# Patient Record
Sex: MALE | Race: WHITE | NOT HISPANIC OR LATINO | Employment: OTHER | URBAN - METROPOLITAN AREA
[De-identification: names, ages, dates, MRNs, and addresses within clinical notes are randomized per-mention and may not be internally consistent; named-entity substitution may affect disease eponyms.]

---

## 2018-01-12 ENCOUNTER — TRANSCRIBE ORDERS (OUTPATIENT)
Dept: LAB | Facility: HOSPITAL | Age: 68
End: 2018-01-12

## 2018-01-12 ENCOUNTER — APPOINTMENT (OUTPATIENT)
Dept: LAB | Facility: HOSPITAL | Age: 68
End: 2018-01-12

## 2018-01-12 DIAGNOSIS — R50.9 HYPERTHERMIA-INDUCED DEFECT: ICD-10-CM

## 2018-01-12 DIAGNOSIS — R53.83 OTHER FATIGUE: ICD-10-CM

## 2018-01-12 DIAGNOSIS — R05.9 COUGH: Primary | ICD-10-CM

## 2018-01-12 LAB
FLUAV SUBTYP SPEC NAA+PROBE: NOT DETECTED
FLUBV RNA ISLT QL NAA+PROBE: DETECTED

## 2018-01-12 PROCEDURE — 87502 INFLUENZA DNA AMP PROBE: CPT | Performed by: PHYSICIAN ASSISTANT

## 2018-02-12 ENCOUNTER — LAB REQUISITION (OUTPATIENT)
Dept: LAB | Facility: HOSPITAL | Age: 68
End: 2018-02-12

## 2018-02-12 DIAGNOSIS — Z00.00 ROUTINE GENERAL MEDICAL EXAMINATION AT A HEALTH CARE FACILITY: ICD-10-CM

## 2018-02-12 LAB — D DIMER PPP FEU-MCNC: 0.41 MG/L (FEU) (ref 0–0.5)

## 2018-02-12 PROCEDURE — 85379 FIBRIN DEGRADATION QUANT: CPT | Performed by: INTERNAL MEDICINE

## 2018-04-12 ENCOUNTER — HOSPITAL ENCOUNTER (OUTPATIENT)
Dept: GENERAL RADIOLOGY | Facility: HOSPITAL | Age: 68
Discharge: HOME | End: 2018-04-12
Attending: INTERNAL MEDICINE | Admitting: INTERNAL MEDICINE

## 2018-04-12 ENCOUNTER — TRANSCRIBE ORDERS (OUTPATIENT)
Dept: ADMINISTRATIVE | Facility: HOSPITAL | Age: 68
End: 2018-04-12

## 2018-04-12 DIAGNOSIS — R05.9 COUGH: Primary | ICD-10-CM

## 2018-04-12 DIAGNOSIS — R05.8 SPUTUM PRODUCTION: ICD-10-CM

## 2018-04-12 PROCEDURE — 71046 X-RAY EXAM CHEST 2 VIEWS: CPT

## 2018-04-13 ENCOUNTER — TRANSCRIBE ORDERS (OUTPATIENT)
Dept: ADMINISTRATIVE | Facility: HOSPITAL | Age: 68
End: 2018-04-13

## 2018-04-13 DIAGNOSIS — Z87.891 PERSONAL HISTORY OF TOBACCO USE, PRESENTING HAZARDS TO HEALTH: Primary | ICD-10-CM

## 2018-04-20 ENCOUNTER — HOSPITAL ENCOUNTER (OUTPATIENT)
Dept: ULTRASOUND IMAGING | Facility: HOSPITAL | Age: 68
Discharge: HOME | End: 2018-04-20
Attending: INTERNAL MEDICINE | Admitting: INTERNAL MEDICINE

## 2018-04-20 ENCOUNTER — TRANSCRIBE ORDERS (OUTPATIENT)
Dept: ADMINISTRATIVE | Facility: HOSPITAL | Age: 68
End: 2018-04-20

## 2018-04-20 DIAGNOSIS — Z87.891 PERSONAL HISTORY OF TOBACCO USE, PRESENTING HAZARDS TO HEALTH: ICD-10-CM

## 2018-04-20 DIAGNOSIS — R05.9 COUGH: Primary | ICD-10-CM

## 2018-04-20 DIAGNOSIS — J44.9 CHRONIC OBSTRUCTIVE PULMONARY DISEASE, UNSPECIFIED COPD TYPE (HCC): ICD-10-CM

## 2018-04-20 PROCEDURE — 76706 US ABDL AORTA SCREEN AAA: CPT

## 2018-05-02 ENCOUNTER — HOSPITAL ENCOUNTER (OUTPATIENT)
Dept: CT IMAGING | Facility: HOSPITAL | Age: 68
Discharge: HOME | End: 2018-05-02
Attending: INTERNAL MEDICINE | Admitting: INTERNAL MEDICINE

## 2018-05-02 DIAGNOSIS — J44.9 CHRONIC OBSTRUCTIVE PULMONARY DISEASE, UNSPECIFIED COPD TYPE (HCC): ICD-10-CM

## 2018-05-02 DIAGNOSIS — R05.9 COUGH: ICD-10-CM

## 2018-05-02 PROCEDURE — 71250 CT THORAX DX C-: CPT

## 2018-10-08 ENCOUNTER — TELEPHONE (OUTPATIENT)
Dept: FAMILY MEDICINE CLINIC | Facility: CLINIC | Age: 68
End: 2018-10-08

## 2018-10-08 NOTE — TELEPHONE ENCOUNTER
DIOVAN 160-12.5 MG 1 A DAY 30 DAY SUPPLY    [facility] [place]    PT LEFT PRESCRIPTION AT HOME AND DOES NOT HAVE ANY IN [place].

## 2018-10-09 NOTE — TELEPHONE ENCOUNTER
Patient was able to get his refill sent from his local pharmacy to the [facility] in [place]. Does not need a refill.

## 2018-10-22 ENCOUNTER — OFFICE VISIT (OUTPATIENT)
Dept: FAMILY MEDICINE CLINIC | Facility: CLINIC | Age: 68
End: 2018-10-22

## 2018-10-22 VITALS
HEIGHT: 71 IN | BODY MASS INDEX: 27.05 KG/M2 | OXYGEN SATURATION: 98 % | DIASTOLIC BLOOD PRESSURE: 80 MMHG | WEIGHT: 193.2 LBS | SYSTOLIC BLOOD PRESSURE: 122 MMHG | HEART RATE: 59 BPM

## 2018-10-22 DIAGNOSIS — J30.2 SEASONAL ALLERGIES: Primary | ICD-10-CM

## 2018-10-22 DIAGNOSIS — I10 ESSENTIAL HYPERTENSION: ICD-10-CM

## 2018-10-22 DIAGNOSIS — Z23 FLU VACCINE NEED: ICD-10-CM

## 2018-10-22 PROCEDURE — 90662 IIV NO PRSV INCREASED AG IM: CPT | Performed by: PHYSICIAN ASSISTANT

## 2018-10-22 PROCEDURE — 99214 OFFICE O/P EST MOD 30 MIN: CPT | Performed by: PHYSICIAN ASSISTANT

## 2018-10-22 PROCEDURE — 90471 IMMUNIZATION ADMIN: CPT | Performed by: PHYSICIAN ASSISTANT

## 2018-10-22 RX ORDER — MONTELUKAST SODIUM 10 MG/1
10 TABLET ORAL NIGHTLY
COMMUNITY
Start: 2018-08-08 | End: 2018-10-22 | Stop reason: SDUPTHER

## 2018-10-22 RX ORDER — VALSARTAN AND HYDROCHLOROTHIAZIDE 160; 12.5 MG/1; MG/1
TABLET, FILM COATED ORAL
Refills: 0 | COMMUNITY
Start: 2018-10-08 | End: 2019-11-21 | Stop reason: SDUPTHER

## 2018-10-22 RX ORDER — EVOLOCUMAB 420 MG/3.5
KIT SUBCUTANEOUS
COMMUNITY
Start: 2018-09-26

## 2018-10-22 RX ORDER — MONTELUKAST SODIUM 10 MG/1
10 TABLET ORAL NIGHTLY
Qty: 30 TABLET | Refills: 2 | Status: SHIPPED | OUTPATIENT
Start: 2018-10-22 | End: 2019-02-22 | Stop reason: SDUPTHER

## 2018-10-22 NOTE — PROGRESS NOTES
Chief Complaint   Patient presents with   • Establish Care     Pt was previously seen by Dr Davis, seen Sally there.       HPI     Omi Owens is a 68 y.o. male who presents to [facility] care.  Patient was previously seen by Dr. Davis and myself at [facility].  Patient's past medical history is significant for hyperlipidemia, hypertension, seasonal allergies, cardiac stent and history of colon polyps.  Patient's blood pressure is stable and well-controlled.  He is followed by Dr. Ellis.  He is on repatha for cholesterol as he was not able to tolerate statins.  He recently had cough and seasonal allergies which were treated at [facility] in another [place] with azithromycin.  He is doing well today and has no complaints.  Chief Complaint   Patient presents with   • Establish Care     Pt was previously seen by Dr Davis, seen Sally there.       Past Medical History:   Diagnosis Date   • Colon polyp    • Hyperlipidemia    • Hypertension        Past Surgical History:   Procedure Laterality Date   • HERNIA REPAIR         Family History   Problem Relation Age of Onset   • Heart attack Mother    • Heart attack Father        Social History     Social History   • Marital status:      Spouse name: N/A   • Number of children: N/A   • Years of education: N/A     Occupational History   • Not on file.     Social History Main Topics   • Smoking status: Former Smoker   • Smokeless tobacco: Never Used   • Alcohol use Yes   • Drug use: No   • Sexual activity: Defer     Other Topics Concern   • Not on file     Social History Narrative   • No narrative on file       Allergies   Allergen Reactions   • Statins Myalgia       ROS    Review of Systems   Constitutional: Negative for activity change, appetite change, chills, diaphoresis, fatigue, fever, unexpected weight gain and unexpected weight loss.   HENT: Negative for congestion, dental problem, ear pain, hearing loss, nosebleeds, sinus pressure, sore throat and trouble swallowing.   "  Eyes: Negative for blurred vision, pain, redness and visual disturbance.   Respiratory: Positive for cough. Negative for apnea, chest tightness, shortness of breath and wheezing.    Cardiovascular: Negative for chest pain, palpitations and leg swelling.   Gastrointestinal: Negative for abdominal distention, abdominal pain, anal bleeding, blood in stool, constipation, diarrhea, nausea, vomiting, GERD and indigestion.   Endocrine: Negative for cold intolerance, heat intolerance, polydipsia, polyphagia and polyuria.   Genitourinary: Negative for urinary incontinence, decreased urine volume, difficulty urinating, dysuria, frequency, hematuria and urgency.   Musculoskeletal: Negative for gait problem, joint swelling and bursitis.   Skin: Negative for dry skin, rash, skin lesions and bruise.   Neurological: Negative for dizziness, tremors, seizures, syncope, speech difficulty, weakness, light-headedness, headache, memory problem and confusion.   Hematological: Does not bruise/bleed easily.   Psychiatric/Behavioral: Negative for agitation, behavioral problems, decreased concentration, hallucinations, sleep disturbance, suicidal ideas, depressed mood and stress. The patient is not nervous/anxious.        Vitals:    10/22/18 1134   BP: 122/80   BP Location: Right arm   Patient Position: Sitting   Cuff Size: Large Adult   Pulse: 59   SpO2: 98%   Weight: 87.6 kg (193 lb 3.2 oz)   Height: 180.3 cm (71\")         Current Outpatient Prescriptions:   •  montelukast (SINGULAIR) 10 MG tablet, Take 1 tablet by mouth Every Night., Disp: 30 tablet, Rfl: 2  •  REPATHA PUSHTRONEX SYSTEM 420 MG/3.5ML solution cartridge, , Disp: , Rfl:   •  valsartan-hydrochlorothiazide (DIOVAN-HCT) 160-12.5 MG per tablet, take 1/2 tablet by mouth once daily, Disp: , Rfl: 0    PE    Physical Exam   Constitutional: He is oriented to person, place, and time. He appears well-developed and well-nourished. No distress.   HENT:   Head: Normocephalic.   Eyes: " Conjunctivae are normal.   Neck: Normal range of motion.   Cardiovascular: Normal rate, regular rhythm and normal heart sounds.    Pulmonary/Chest: Effort normal and breath sounds normal.   Dry cough   Abdominal: Soft. Normal appearance. There is no tenderness. There is negative Durham's sign.   Musculoskeletal: Normal range of motion. He exhibits no edema.   Neurological: He is alert and oriented to person, place, and time.   Skin: Skin is warm. No rash noted. He is not diaphoretic. No erythema.   Psychiatric: He has a normal mood and affect. His behavior is normal. Judgment and thought content normal.   Vitals reviewed.      A/P    Omi was seen today for establish care.    Diagnoses and all orders for this visit:    Seasonal allergies  -stable, doing well  -     montelukast (SINGULAIR) 10 MG tablet; Take 1 tablet by mouth Every Night.    Essential hypertension  -stable, well-controlled  -followed by Dr. Ellis  -Dr. Ellis manages his repatha    Flu vaccine need  -     Flu Vaccine High Dose PF 65YR+ ([id number])     Patient is going to look into hepatitis A vaccination.    Plan of care reviewed with patient at the conclusion of today's visit. Education was provided regarding diagnosis, management and any prescribed or recommended OTC medications.  Patient verbalizes understanding of and agreement with management plan.    Return in about 6 months (around 4/22/2019) for Annual physical, Medicare Wellness.     Sally Mann PA-C

## 2018-10-23 NOTE — PROGRESS NOTES
I have reviewed the notes, assessments, and/or procedures performed by EMEKA Reardon, I concur with her/his documentation of Omi Owens.

## 2019-02-22 ENCOUNTER — TELEPHONE (OUTPATIENT)
Dept: FAMILY MEDICINE CLINIC | Facility: CLINIC | Age: 69
End: 2019-02-22

## 2019-02-22 DIAGNOSIS — J30.2 SEASONAL ALLERGIES: ICD-10-CM

## 2019-02-22 RX ORDER — MONTELUKAST SODIUM 10 MG/1
10 TABLET ORAL NIGHTLY
Qty: 30 TABLET | Refills: 1 | Status: SHIPPED | OUTPATIENT
Start: 2019-02-22 | End: 2019-03-28 | Stop reason: SDUPTHER

## 2019-03-28 DIAGNOSIS — J30.2 SEASONAL ALLERGIES: ICD-10-CM

## 2019-03-29 RX ORDER — MONTELUKAST SODIUM 10 MG/1
TABLET ORAL
Qty: 90 TABLET | Refills: 1 | Status: SHIPPED | OUTPATIENT
Start: 2019-03-29 | End: 2020-12-03

## 2019-04-22 ENCOUNTER — LAB REQUISITION (OUTPATIENT)
Dept: LAB | Facility: HOSPITAL | Age: 69
End: 2019-04-22

## 2019-04-22 ENCOUNTER — OFFICE VISIT (OUTPATIENT)
Dept: FAMILY MEDICINE CLINIC | Facility: CLINIC | Age: 69
End: 2019-04-22

## 2019-04-22 VITALS
WEIGHT: 193.4 LBS | OXYGEN SATURATION: 96 % | DIASTOLIC BLOOD PRESSURE: 78 MMHG | HEART RATE: 50 BPM | HEIGHT: 71 IN | BODY MASS INDEX: 27.07 KG/M2 | SYSTOLIC BLOOD PRESSURE: 126 MMHG

## 2019-04-22 DIAGNOSIS — E78.2 MIXED HYPERLIPIDEMIA: ICD-10-CM

## 2019-04-22 DIAGNOSIS — Z00.00 ROUTINE GENERAL MEDICAL EXAMINATION AT A HEALTH CARE FACILITY: ICD-10-CM

## 2019-04-22 DIAGNOSIS — Z00.00 WELLNESS EXAMINATION: Primary | ICD-10-CM

## 2019-04-22 DIAGNOSIS — Z23 NEED FOR PNEUMOCOCCAL VACCINE: ICD-10-CM

## 2019-04-22 DIAGNOSIS — J30.2 SEASONAL ALLERGIES: ICD-10-CM

## 2019-04-22 DIAGNOSIS — E55.9 VITAMIN D DEFICIENCY: ICD-10-CM

## 2019-04-22 DIAGNOSIS — I10 ESSENTIAL HYPERTENSION: ICD-10-CM

## 2019-04-22 DIAGNOSIS — Z00.00 PHYSICAL EXAM, ANNUAL: ICD-10-CM

## 2019-04-22 DIAGNOSIS — Z11.59 ENCOUNTER FOR HEPATITIS C SCREENING TEST FOR LOW RISK PATIENT: ICD-10-CM

## 2019-04-22 DIAGNOSIS — Z12.5 SPECIAL SCREENING, PROSTATE CANCER: ICD-10-CM

## 2019-04-22 PROCEDURE — 36415 COLL VENOUS BLD VENIPUNCTURE: CPT | Performed by: PHYSICIAN ASSISTANT

## 2019-04-22 PROCEDURE — 96160 PT-FOCUSED HLTH RISK ASSMT: CPT | Performed by: PHYSICIAN ASSISTANT

## 2019-04-22 PROCEDURE — G0009 ADMIN PNEUMOCOCCAL VACCINE: HCPCS | Performed by: PHYSICIAN ASSISTANT

## 2019-04-22 PROCEDURE — 99397 PER PM REEVAL EST PAT 65+ YR: CPT | Performed by: PHYSICIAN ASSISTANT

## 2019-04-22 PROCEDURE — 90732 PPSV23 VACC 2 YRS+ SUBQ/IM: CPT | Performed by: PHYSICIAN ASSISTANT

## 2019-04-22 PROCEDURE — G0439 PPPS, SUBSEQ VISIT: HCPCS | Performed by: PHYSICIAN ASSISTANT

## 2019-04-22 NOTE — PATIENT INSTRUCTIONS
"General Health Maintenance:  -Yearly dermatology exam  -Yearly eye exam if you are diabetic, otherwise every 2 years for patients without diabetes  -Dental exams/cleanings at least twice a year  -Staying current on your required colonoscopy, starts at age 50 unless there are other indications prior  -Prostate exam either manually in office or prostate specific antigen lab testing yearly starting at 50    Vaccines:  -Talk to pharmacist about shingrix shot  -Obtain flu shot when available  -Health department is recommending Hepatitis A vaccine    The largest impact you can have on your own health is getting the proper nutrition, exercise and maintaining an overall healthy body weight.  Proper nutrition involves eating lots of vegetables, fruit, minimal lean meat and avoiding processed foods and limiting refined sugars, carbohydrates and starches (\"the white stuff\").  Drinking at least 8 cups of water daily.  Walking at least 30 minutes a day and if possible, increasing this to a more cardiovascular aerobic exercise.  Maintaining a healthy body weight.      If you smoke or use tobacco products, quitting is extremely important and I am happy to discuss options with you regarding how to do this and what's available to assist you.    If you consume alcohol, limit your alcohol intake to 2 drinks a day for men and 1 drink a day for woman.    It is also important to make sure to keep regular appointments and have all general health maintenance items completed in a timely manner.      Thank you for entrusting me with your care.  It is a pleasure and honor to participate in your health.          "

## 2019-04-22 NOTE — PROGRESS NOTES
QUICK REFERENCE INFORMATION:  The ABCs of the Annual Wellness Visit    Medicare Subsequent Wellness Visit    Chief Complaint   Patient presents with   • Medicare Wellness-subsequent        Patient is a pleasant 67 y/o white male who presents to the office for annual subsequent medicare wellness exam and annual physical exam.  Patient's past medical history is significant for hypertension, hyperlipidemia and colon polyps.  He is up-to-date on colonoscopy, ophthalmology and dental exams.  Regularly seen by dermatologist.  His blood pressure is stable and well-controlled.  He is compliant on all medications.  Allergic to statins, currently on repatha.  Followed by cardiology, has stent and is on ASA 81 mg QD.  Denies any issues or concerns today.  Reports mild nasal drainage and dry cough since returning to [place] from cruise.  He recently retired and is enjoying his time off being active and golfing.  Exercises regularly and eats healthy.  Compliant on supplements.  Previously had prevnar vaccine, due for pneumonia 23 today.  Obtaining hepatitis A vaccinations at pharmacy, second one is due on the 24th.  He will inquire about shingrix vaccine.         Omi Owens is a 68 y.o. male presenting for Medicare Wellness-subsequent  .     Past Medical History:   Diagnosis Date   • Colon polyp    • Hyperlipidemia    • Hypertension       Past Surgical History:   Procedure Laterality Date   • EYE SURGERY     • HERNIA REPAIR       Family History   Problem Relation Age of Onset   • Heart attack Mother    • Heart attack Father       Social History     Socioeconomic History   • Marital status:      Spouse name: Not on file   • Number of children: Not on file   • Years of education: Not on file   • Highest education level: Not on file   Tobacco Use   • Smoking status: Former Smoker   • Smokeless tobacco: Never Used   Substance and Sexual Activity   • Alcohol use: Yes   • Drug use: No   • Sexual activity: Defer       Allergies   Allergen Reactions   • Statins Myalgia      Outpatient Medications Prior to Visit   Medication Sig Dispense Refill   • aspirin 81 MG tablet Take 81 mg by mouth Daily.     • montelukast (SINGULAIR) 10 MG tablet take 1 tablet by mouth once daily 90 tablet 1   • REPATHA PUSHTRONEX SYSTEM 420 MG/3.5ML solution cartridge      • valsartan-hydrochlorothiazide (DIOVAN-HCT) 160-12.5 MG per tablet take 1/2 tablet by mouth once daily  0     No facility-administered medications prior to visit.        Reviewed use of high risk medication in the elderly: not applicable  Reviewed for potential of harmful drug interactions in the elderly: not applicable    The following portions of the patient's history were reviewed and updated as appropriate: allergies, current medications, past family history, past medical history, past social history, past surgical history and problem list.    Review of Systems   Constitutional: Negative for chills, diaphoresis, fatigue and fever.   HENT: Positive for postnasal drip and rhinorrhea. Negative for congestion, ear pain, hearing loss, nosebleeds and sore throat.    Eyes: Positive for itching. Negative for pain.   Respiratory: Positive for cough. Negative for shortness of breath and wheezing.    Cardiovascular: Negative for chest pain, palpitations and leg swelling.   Gastrointestinal: Negative for abdominal pain, constipation, diarrhea, nausea and vomiting.   Endocrine: Negative for polyuria.   Genitourinary: Negative for dysuria, flank pain and hematuria.   Musculoskeletal: Negative for arthralgias, gait problem and myalgias.   Skin: Negative for rash and skin lesions.   Neurological: Negative for dizziness, headache and memory problem.   Psychiatric/Behavioral: Negative for sleep disturbance, suicidal ideas, depressed mood and stress. The patient is not nervous/anxious.         Vitals:    04/22/19 0828   BP: 126/78   BP Location: Right arm   Patient Position: Sitting   Cuff Size:  "Adult   Pulse: 50   SpO2: 96%   Weight: 87.7 kg (193 lb 6.4 oz)   Height: 180.3 cm (71\")   PainSc: 0-No pain       Objective    Physical Exam   Constitutional: He is oriented to person, place, and time. Vital signs are normal. He appears well-developed and well-nourished. He is active and cooperative. He does not appear ill. No distress. He is not overweight.  HENT:   Head: Normocephalic and atraumatic.   Right Ear: Hearing, tympanic membrane, external ear and ear canal normal.   Left Ear: Hearing, tympanic membrane, external ear and ear canal normal.   Nose: Nose normal. Right sinus exhibits no maxillary sinus tenderness and no frontal sinus tenderness. Left sinus exhibits no maxillary sinus tenderness and no frontal sinus tenderness.   Mouth/Throat: Uvula is midline, oropharynx is clear and moist and mucous membranes are normal.   Eyes: Conjunctivae, EOM and lids are normal.   Neck: Trachea normal, normal range of motion and phonation normal. No thyroid mass and no thyromegaly present.   Cardiovascular: Normal rate, regular rhythm and normal heart sounds.   Pulmonary/Chest: Effort normal and breath sounds normal.   Abdominal: Soft. Normal appearance. He exhibits no distension. There is no tenderness. There is no rigidity, no guarding and no CVA tenderness.   Musculoskeletal: Normal range of motion.   Lymphadenopathy:     He has no cervical adenopathy.        Right cervical: No superficial cervical adenopathy present.       Left cervical: No superficial cervical adenopathy present.   Neurological: He is alert and oriented to person, place, and time. He has normal strength and normal reflexes. Coordination and gait normal.   CN grossly intact   Skin: Skin is warm and intact. No rash noted. He is not diaphoretic. No cyanosis or erythema. Nails show no clubbing.   Psychiatric: He has a normal mood and affect. His speech is normal and behavior is normal. Judgment and thought content normal. He is not actively " hallucinating. Cognition and memory are normal. He is attentive.   Vitals reviewed.       HEALTH RISK ASSESSMENT    1950    Recent Hospitalizations:  No hospitalization(s) within the last year..      Current Medical Providers:  Patient Care Team:  Sally Mann PA-C as PCP - General (Physician Assistant)      Smoking Status:  Social History     Tobacco Use   Smoking Status Former Smoker   Smokeless Tobacco Never Used       Alcohol Consumption:  Social History     Substance and Sexual Activity   Alcohol Use Yes       Depression Screen:   PHQ-2/PHQ-9 Depression Screening 4/22/2019   Little interest or pleasure in doing things 0   Feeling down, depressed, or hopeless 0   Total Score 0       Health Habits and Functional and Cognitive Screening:  Functional & Cognitive Status 4/22/2019   Do you have difficulty preparing food and eating? No   Do you have difficulty bathing yourself, getting dressed or grooming yourself? No   Do you have difficulty using the toilet? No   Do you have difficulty moving around from place to place? No   Do you have trouble with steps or getting out of a bed or a chair? No   In the past year have you fallen or experienced a near fall? No   Current Diet Well Balanced Diet   Dental Exam Up to date   Eye Exam Up to date   Exercise (times per week) 5 times per week   Current Exercise Activities Include Running/Jogging   Do you need help using the phone?  No   Are you deaf or do you have serious difficulty hearing?  No   Do you need help with transportation? No   Do you need help shopping? No   Do you need help preparing meals?  No   Do you need help with housework?  No   Do you need help with laundry? No   Do you need help taking your medications? No   Do you need help managing money? No   Do you ever drive or ride in a car without wearing a seat belt? No   Have you felt unusual stress, anger or loneliness in the last month? No   Who do you live with? Spouse   If you need help, do  you have trouble finding someone available to you? No   Have you been bothered in the last four weeks by sexual problems? No   Do you have difficulty concentrating, remembering or making decisions? No           Does the patient have evidence of cognitive impairment? No    Aspirin use counseling? Taking ASA appropriately as indicated      Recent Lab Results:  CMP:  Lab Results   Component Value Date    BUN 15 02/16/2015    CREATININE 0.8 02/16/2015     02/16/2015    K 3.9 02/16/2015    CO2 32 (H) 02/16/2015    CALCIUM 8.0 (L) 02/16/2015    ALBUMIN 3.7 02/15/2015    BILITOT 0.6 02/15/2015    ALKPHOS 42 02/15/2015    AST 16 02/15/2015    ALT 16 02/15/2015     Lipid Panel:     HbA1c:       Visual Acuity:  No exam data present    Age-appropriate Screening Schedule:  Refer to the list below for future screening recommendations based on patient's age, sex and/or medical conditions. Orders for these recommended tests are listed in the plan section. The patient has been provided with a written plan.    Health Maintenance   Topic Date Due   • ZOSTER VACCINE (2 of 3) 12/15/2013   • PNEUMOCOCCAL VACCINES (65+ LOW/MEDIUM RISK) (1 of 2 - PCV13) 07/28/2016   • INFLUENZA VACCINE  08/01/2019   • LIPID PANEL  04/22/2020   • COLONOSCOPY  06/16/2020   • TDAP/TD VACCINES (2 - Td) 02/03/2022          Advance Care Planning:  Patient does not have an advance directive - information provided to the patient today    Identification of Risk Factors:  Risk factors include: cardiovascular risk.    Compared to one year ago, the patient feels his physical health is the same.  Compared to one year ago, the patient feels his mental health is better.      Omi was seen today for medicare wellness-subsequent.    Diagnoses and all orders for this visit:    Wellness examination  -     Hemoglobin A1c  -     TSH  -     CBC Auto Differential    Physical exam, annual  -physical exam is unremarkable, pending labs  -up-to-date on eye and dental  exams  -goes to dermatologist regularly  -pending second vaccination for hepatitis A  -completed prevnar, will obtain pneumonia 23 today  -discussed shingrix vaccine  -colonoscopy up-to-date    Essential hypertension  -stable, well-controlled  -will defer ECG to cardiology  -     Comprehensive Metabolic Panel    Seasonal allergies  -discussed starting anti-histamine and nasal saline lavage  -call if symptoms worsen or change    Vitamin D deficiency  -     Vitamin D 25 Hydroxy    Mixed hyperlipidemia  -followed by cardiology, has a stent, on ASA 81 mg QD  -unable to tolerate statins, on repatha  -will repeat lipid panel today  -     Lipid Panel    Special screening, prostate cancer  -     PSA Screen    Encounter for hepatitis C screening test for low risk patient  -     Hepatitis C Antibody    Need for pneumococcal vaccine  -     Pneumococcal polysaccharide vaccine 23-valent >= 1yo subcutaneous/IM (PPSV23)        Procedure   Procedures       Patient Self-Management and Personalized Health Advice  The patient has been provided with information about: diet, exercise and supplements and preventive services including:   · Advance directive, Diabetes screening, see lab orders, Pneumococcal vaccine , Prostate cancer screening discussed.      Follow Up:  Return in about 6 months (around 10/22/2019) for Recheck, blood pressure.     An After Visit Summary and PPPS with all of these plans were given to the patient.

## 2019-04-23 LAB
25(OH)D3+25(OH)D2 SERPL-MCNC: 89.4 NG/ML (ref 30–100)
ALBUMIN SERPL-MCNC: 4.9 G/DL (ref 3.5–5.2)
ALBUMIN/GLOB SERPL: 2 G/DL
ALP SERPL-CCNC: 59 U/L (ref 39–117)
ALT SERPL-CCNC: 15 U/L (ref 1–41)
AST SERPL-CCNC: 21 U/L (ref 1–40)
BASOPHILS # BLD AUTO: 0.07 10*3/MM3 (ref 0–0.2)
BASOPHILS NFR BLD AUTO: 1.2 % (ref 0–1.5)
BILIRUB SERPL-MCNC: 0.9 MG/DL (ref 0.2–1.2)
BUN SERPL-MCNC: 20 MG/DL (ref 8–23)
BUN/CREAT SERPL: 17.2 (ref 7–25)
CALCIUM SERPL-MCNC: 9.9 MG/DL (ref 8.6–10.5)
CHLORIDE SERPL-SCNC: 99 MMOL/L (ref 98–107)
CHOLEST SERPL-MCNC: 150 MG/DL (ref 0–200)
CO2 SERPL-SCNC: 28.9 MMOL/L (ref 22–29)
CREAT SERPL-MCNC: 1.16 MG/DL (ref 0.76–1.27)
EOSINOPHIL # BLD AUTO: 0.05 10*3/MM3 (ref 0–0.4)
EOSINOPHIL NFR BLD AUTO: 0.9 % (ref 0.3–6.2)
ERYTHROCYTE [DISTWIDTH] IN BLOOD BY AUTOMATED COUNT: 13.3 % (ref 12.3–15.4)
GLOBULIN SER CALC-MCNC: 2.4 GM/DL
GLUCOSE SERPL-MCNC: 94 MG/DL (ref 65–99)
HBA1C MFR BLD: 4.9 % (ref 4.8–5.6)
HCT VFR BLD AUTO: 52.7 % (ref 37.5–51)
HCV AB S/CO SERPL IA: <0.1 S/CO RATIO (ref 0–0.9)
HDLC SERPL-MCNC: 78 MG/DL (ref 40–60)
HGB BLD-MCNC: 17.4 G/DL (ref 13–17.7)
IMM GRANULOCYTES # BLD AUTO: 0.02 10*3/MM3 (ref 0–0.05)
IMM GRANULOCYTES NFR BLD AUTO: 0.3 % (ref 0–0.5)
LDLC SERPL CALC-MCNC: 60 MG/DL (ref 0–100)
LYMPHOCYTES # BLD AUTO: 0.91 10*3/MM3 (ref 0.7–3.1)
LYMPHOCYTES NFR BLD AUTO: 15.6 % (ref 19.6–45.3)
MCH RBC QN AUTO: 32.5 PG (ref 26.6–33)
MCHC RBC AUTO-ENTMCNC: 33 G/DL (ref 31.5–35.7)
MCV RBC AUTO: 98.5 FL (ref 79–97)
MONOCYTES # BLD AUTO: 0.55 10*3/MM3 (ref 0.1–0.9)
MONOCYTES NFR BLD AUTO: 9.4 % (ref 5–12)
NEUTROPHILS # BLD AUTO: 4.25 10*3/MM3 (ref 1.7–7)
NEUTROPHILS NFR BLD AUTO: 72.6 % (ref 42.7–76)
NRBC BLD AUTO-RTO: 0 /100 WBC (ref 0–0.2)
PLATELET # BLD AUTO: 209 10*3/MM3 (ref 140–450)
POTASSIUM SERPL-SCNC: 4.3 MMOL/L (ref 3.5–5.2)
PROT SERPL-MCNC: 7.3 G/DL (ref 6–8.5)
PSA SERPL-MCNC: 1.45 NG/ML (ref 0–4)
RBC # BLD AUTO: 5.35 10*6/MM3 (ref 4.14–5.8)
SODIUM SERPL-SCNC: 141 MMOL/L (ref 136–145)
TRIGL SERPL-MCNC: 61 MG/DL (ref 0–150)
TSH SERPL DL<=0.005 MIU/L-ACNC: 1.3 UIU/ML (ref 0.45–4.5)
VLDLC SERPL CALC-MCNC: 12.2 MG/DL
WBC # BLD AUTO: 5.85 10*3/MM3 (ref 3.4–10.8)

## 2019-07-10 ENCOUNTER — OFFICE VISIT (OUTPATIENT)
Dept: FAMILY MEDICINE CLINIC | Facility: CLINIC | Age: 69
End: 2019-07-10

## 2019-07-10 VITALS
SYSTOLIC BLOOD PRESSURE: 116 MMHG | OXYGEN SATURATION: 98 % | DIASTOLIC BLOOD PRESSURE: 76 MMHG | BODY MASS INDEX: 27.13 KG/M2 | HEART RATE: 57 BPM | HEIGHT: 71 IN | WEIGHT: 193.8 LBS

## 2019-07-10 DIAGNOSIS — J30.2 SEASONAL ALLERGIES: Primary | ICD-10-CM

## 2019-07-10 DIAGNOSIS — J01.00 ACUTE NON-RECURRENT MAXILLARY SINUSITIS: ICD-10-CM

## 2019-07-10 PROCEDURE — 99213 OFFICE O/P EST LOW 20 MIN: CPT | Performed by: PHYSICIAN ASSISTANT

## 2019-07-10 RX ORDER — LORATADINE 10 MG/1
1 CAPSULE, LIQUID FILLED ORAL DAILY
COMMUNITY

## 2019-07-10 RX ORDER — AMOXICILLIN 875 MG/1
875 TABLET, COATED ORAL EVERY 12 HOURS SCHEDULED
Qty: 20 TABLET | Refills: 0 | Status: SHIPPED | OUTPATIENT
Start: 2019-07-10 | End: 2019-07-20

## 2019-07-10 NOTE — PROGRESS NOTES
"Chief Complaint   Patient presents with   • Cough     cough, congestion, drainage. has been going on for about 2 weeks.       HPI      Omi Owens is a 68 y.o. male who presents for Cough (cough, congestion, drainage. has been going on for about 2 weeks.)    Patient presents with productive cough, facial congestion and yellow nasal drainage that has been present for the last 2 weeks.  He denies shortness of breath, fever, chills, sore throat or ear pain.  He thinks his cough may be improving but is concerned because he is going out of town and will be outside watching baseball games.  He has allergies and is compliant on claritin, singulair and nasal saline lavage.  No antibiotic allergies.    Past Medical History:   Diagnosis Date   • Colon polyp    • Hyperlipidemia    • Hypertension        Past Surgical History:   Procedure Laterality Date   • EYE SURGERY     • HERNIA REPAIR         Family History   Problem Relation Age of Onset   • Heart attack Mother    • Heart attack Father        Social History     Socioeconomic History   • Marital status:      Spouse name: Not on file   • Number of children: Not on file   • Years of education: Not on file   • Highest education level: Not on file   Tobacco Use   • Smoking status: Former Smoker   • Smokeless tobacco: Never Used   Substance and Sexual Activity   • Alcohol use: Yes   • Drug use: No   • Sexual activity: Defer       Allergies   Allergen Reactions   • Statins Myalgia       ROS    Review of Systems   Constitutional: Negative for chills, fatigue and fever.   HENT: Positive for congestion, rhinorrhea and sinus pressure. Negative for ear pain and sore throat.    Respiratory: Positive for cough. Negative for shortness of breath and wheezing.        Vitals:    07/10/19 0814   BP: 116/76   BP Location: Right arm   Patient Position: Sitting   Cuff Size: Adult   Pulse: 57   SpO2: 98%   Weight: 87.9 kg (193 lb 12.8 oz)   Height: 180.3 cm (71\")       Current " Outpatient Medications on File Prior to Visit   Medication Sig Dispense Refill   • aspirin 81 MG tablet Take 81 mg by mouth Daily.     • Loratadine 10 MG capsule Take 1 tablet by mouth Daily.     • montelukast (SINGULAIR) 10 MG tablet take 1 tablet by mouth once daily 90 tablet 1   • REPATHA PUSHTRONEX SYSTEM 420 MG/3.5ML solution cartridge      • valsartan-hydrochlorothiazide (DIOVAN-HCT) 160-12.5 MG per tablet take 1/2 tablet by mouth once daily  0     No current facility-administered medications on file prior to visit.        Results for orders placed or performed in visit on 04/22/19   Hepatitis C Antibody   Result Value Ref Range    Hep C Virus Ab <0.1 0.0 - 0.9 s/co ratio   PSA Screen   Result Value Ref Range    PSA 1.450 0.000 - 4.000 ng/mL   Lipid Panel   Result Value Ref Range    Total Cholesterol 150 0 - 200 mg/dL    Triglycerides 61 0 - 150 mg/dL    HDL Cholesterol 78 (H) 40 - 60 mg/dL    VLDL Cholesterol 12.2 mg/dL    LDL Cholesterol  60 0 - 100 mg/dL   Hemoglobin A1c   Result Value Ref Range    Hemoglobin A1C 4.90 4.80 - 5.60 %   TSH   Result Value Ref Range    TSH 1.300 0.450 - 4.500 uIU/mL   Vitamin D 25 Hydroxy   Result Value Ref Range    25 Hydroxy, Vitamin D 89.4 30.0 - 100.0 ng/ml   Comprehensive Metabolic Panel   Result Value Ref Range    Glucose 94 65 - 99 mg/dL    BUN 20 8 - 23 mg/dL    Creatinine 1.16 0.76 - 1.27 mg/dL    eGFR Non African Am 63 >60 mL/min/1.73    eGFR African Am 76 >60 mL/min/1.73    BUN/Creatinine Ratio 17.2 7.0 - 25.0    Sodium 141 136 - 145 mmol/L    Potassium 4.3 3.5 - 5.2 mmol/L    Chloride 99 98 - 107 mmol/L    Total CO2 28.9 22.0 - 29.0 mmol/L    Calcium 9.9 8.6 - 10.5 mg/dL    Total Protein 7.3 6.0 - 8.5 g/dL    Albumin 4.90 3.50 - 5.20 g/dL    Globulin 2.4 gm/dL    A/G Ratio 2.0 g/dL    Total Bilirubin 0.9 0.2 - 1.2 mg/dL    Alkaline Phosphatase 59 39 - 117 U/L    AST (SGOT) 21 1 - 40 U/L    ALT (SGPT) 15 1 - 41 U/L   CBC & Differential   Result Value Ref Range    WBC  5.85 3.40 - 10.80 10*3/mm3    RBC 5.35 4.14 - 5.80 10*6/mm3    Hemoglobin 17.4 13.0 - 17.7 g/dL    Hematocrit 52.7 (H) 37.5 - 51.0 %    MCV 98.5 (H) 79.0 - 97.0 fL    MCH 32.5 26.6 - 33.0 pg    MCHC 33.0 31.5 - 35.7 g/dL    RDW 13.3 12.3 - 15.4 %    Platelets 209 140 - 450 10*3/mm3    Neutrophil Rel % 72.6 42.7 - 76.0 %    Lymphocyte Rel % 15.6 (L) 19.6 - 45.3 %    Monocyte Rel % 9.4 5.0 - 12.0 %    Eosinophil Rel % 0.9 0.3 - 6.2 %    Basophil Rel % 1.2 0.0 - 1.5 %    Neutrophils Absolute 4.25 1.70 - 7.00 10*3/mm3    Lymphocytes Absolute 0.91 0.70 - 3.10 10*3/mm3    Monocytes Absolute 0.55 0.10 - 0.90 10*3/mm3    Eosinophils Absolute 0.05 0.00 - 0.40 10*3/mm3    Basophils Absolute 0.07 0.00 - 0.20 10*3/mm3    Immature Granulocyte Rel % 0.3 0.0 - 0.5 %    Immature Grans Absolute 0.02 0.00 - 0.05 10*3/mm3    nRBC 0.0 0.0 - 0.2 /100 WBC       PE    Physical Exam   Constitutional: He is oriented to person, place, and time. Vital signs are normal. He appears well-developed and well-nourished. He appears ill. No distress. He is not overweight.  HENT:   Head: Normocephalic and atraumatic.   Right Ear: Hearing, tympanic membrane, external ear and ear canal normal.   Left Ear: Hearing, external ear and ear canal normal.   Nose: Mucosal edema and rhinorrhea present. Right sinus exhibits maxillary sinus tenderness. Right sinus exhibits no frontal sinus tenderness. Left sinus exhibits maxillary sinus tenderness. Left sinus exhibits no frontal sinus tenderness.   Mouth/Throat: Uvula is midline.   Post-nasal drip appreciated on exam.  No exudates or erythema.   Eyes: Conjunctivae are normal.   Neck: Normal range of motion.   Cardiovascular: Normal rate, regular rhythm and normal heart sounds. Exam reveals no gallop and no friction rub.   No murmur heard.  Pulmonary/Chest: Effort normal and breath sounds normal. He has no decreased breath sounds.   Neurological: He is alert and oriented to person, place, and time.   Skin: He is  not diaphoretic.   Psychiatric: He has a normal mood and affect. Judgment normal.   Vitals reviewed.       A/P    Omi was seen today for cough.    Diagnoses and all orders for this visit:    Seasonal allergies  -continue claritin, singulair and nasal saline lavage  -may want to consider switching anti-histamine    Acute non-recurrent maxillary sinusitis  -mild TTP in maxillary sinuses with congestion  -patient reports that it seems to be improving but he is leaving on vacation and will be outside watching baseball  -will prescribe amoxicillin, discussed with patient only taking this if symptoms worsen and he voices understanding  -     amoxicillin (AMOXIL) 875 MG tablet; Take 1 tablet by mouth Every 12 (Twelve) Hours for 10 days.         Plan of care reviewed with patient at the conclusion of today's visit. Education was provided regarding diagnosis, management and any prescribed or recommended OTC medications.  Patient verbalizes understanding of and agreement with management plan.    No Follow-up on file.     Sally Mann PA-C

## 2019-07-10 NOTE — PATIENT INSTRUCTIONS
-recommend switching from claritin to zyrtec D for 7-10 days, then switch to generic zyrtec (without the D)    -if symptoms worsen, then start antibiotics

## 2019-10-30 ENCOUNTER — OFFICE VISIT (OUTPATIENT)
Dept: FAMILY MEDICINE CLINIC | Facility: CLINIC | Age: 69
End: 2019-10-30

## 2019-10-30 ENCOUNTER — LAB REQUISITION (OUTPATIENT)
Dept: LAB | Facility: HOSPITAL | Age: 69
End: 2019-10-30

## 2019-10-30 VITALS
HEIGHT: 71 IN | BODY MASS INDEX: 27.3 KG/M2 | SYSTOLIC BLOOD PRESSURE: 120 MMHG | OXYGEN SATURATION: 99 % | DIASTOLIC BLOOD PRESSURE: 64 MMHG | HEART RATE: 63 BPM | WEIGHT: 195 LBS

## 2019-10-30 DIAGNOSIS — E78.2 MIXED HYPERLIPIDEMIA: ICD-10-CM

## 2019-10-30 DIAGNOSIS — Z00.00 ROUTINE GENERAL MEDICAL EXAMINATION AT A HEALTH CARE FACILITY: ICD-10-CM

## 2019-10-30 DIAGNOSIS — R79.89 ABNORMAL CBC: ICD-10-CM

## 2019-10-30 DIAGNOSIS — I10 ESSENTIAL HYPERTENSION: Primary | ICD-10-CM

## 2019-10-30 LAB
ALBUMIN SERPL-MCNC: 4.7 G/DL (ref 3.5–5.2)
ALBUMIN/GLOB SERPL: 2.1 G/DL
ALP SERPL-CCNC: 63 U/L (ref 39–117)
ALT SERPL-CCNC: 19 U/L (ref 1–41)
AST SERPL-CCNC: 21 U/L (ref 1–40)
BASOPHILS # BLD AUTO: 0.06 10*3/MM3 (ref 0–0.2)
BASOPHILS NFR BLD AUTO: 1 % (ref 0–1.5)
BILIRUB SERPL-MCNC: 0.7 MG/DL (ref 0.2–1.2)
BUN SERPL-MCNC: 20 MG/DL (ref 8–23)
BUN/CREAT SERPL: 17.5 (ref 7–25)
CALCIUM SERPL-MCNC: 9.5 MG/DL (ref 8.6–10.5)
CHLORIDE SERPL-SCNC: 100 MMOL/L (ref 98–107)
CO2 SERPL-SCNC: 27 MMOL/L (ref 22–29)
CREAT SERPL-MCNC: 1.14 MG/DL (ref 0.76–1.27)
EOSINOPHIL # BLD AUTO: 0.05 10*3/MM3 (ref 0–0.4)
EOSINOPHIL NFR BLD AUTO: 0.8 % (ref 0.3–6.2)
ERYTHROCYTE [DISTWIDTH] IN BLOOD BY AUTOMATED COUNT: 13 % (ref 12.3–15.4)
GLOBULIN SER CALC-MCNC: 2.2 GM/DL
GLUCOSE SERPL-MCNC: 79 MG/DL (ref 65–99)
HCT VFR BLD AUTO: 47.7 % (ref 37.5–51)
HGB BLD-MCNC: 16.3 G/DL (ref 13–17.7)
IMM GRANULOCYTES # BLD AUTO: 0.03 10*3/MM3 (ref 0–0.05)
IMM GRANULOCYTES NFR BLD AUTO: 0.5 % (ref 0–0.5)
LYMPHOCYTES # BLD AUTO: 0.95 10*3/MM3 (ref 0.7–3.1)
LYMPHOCYTES NFR BLD AUTO: 15.1 % (ref 19.6–45.3)
MCH RBC QN AUTO: 31.8 PG (ref 26.6–33)
MCHC RBC AUTO-ENTMCNC: 34.2 G/DL (ref 31.5–35.7)
MCV RBC AUTO: 93 FL (ref 79–97)
MONOCYTES # BLD AUTO: 0.57 10*3/MM3 (ref 0.1–0.9)
MONOCYTES NFR BLD AUTO: 9.1 % (ref 5–12)
NEUTROPHILS # BLD AUTO: 4.62 10*3/MM3 (ref 1.7–7)
NEUTROPHILS NFR BLD AUTO: 73.5 % (ref 42.7–76)
NRBC BLD AUTO-RTO: 0 /100 WBC (ref 0–0.2)
PLATELET # BLD AUTO: 219 10*3/MM3 (ref 140–450)
POTASSIUM SERPL-SCNC: 4.1 MMOL/L (ref 3.5–5.2)
PROT SERPL-MCNC: 6.9 G/DL (ref 6–8.5)
RBC # BLD AUTO: 5.13 10*6/MM3 (ref 4.14–5.8)
SODIUM SERPL-SCNC: 141 MMOL/L (ref 136–145)
WBC # BLD AUTO: 6.28 10*3/MM3 (ref 3.4–10.8)

## 2019-10-30 PROCEDURE — 99213 OFFICE O/P EST LOW 20 MIN: CPT | Performed by: PHYSICIAN ASSISTANT

## 2019-10-30 PROCEDURE — 36415 COLL VENOUS BLD VENIPUNCTURE: CPT | Performed by: PHYSICIAN ASSISTANT

## 2019-10-30 RX ORDER — VALACYCLOVIR HYDROCHLORIDE 1 G/1
TABLET, FILM COATED ORAL
Refills: 0 | COMMUNITY
Start: 2019-09-12 | End: 2020-05-04 | Stop reason: ALTCHOICE

## 2019-10-30 NOTE — PROGRESS NOTES
"Chief Complaint   Patient presents with   • Hypertension       HPI     Omi Owens is a pleasant 69 y.o. male who is here for routine hypertension, hyperlipidemia and abnormal CBC.  Patient's blood pressure is stable and well-controlled.  He is compliant on medications.  He is followed by cardiology who manages his repatha.  He had elevated hematocrit and RBC on last labs, will repeat CBC today.  Overall patient is doing well and has no complaints.    Past Medical History:   Diagnosis Date   • Colon polyp    • Hyperlipidemia    • Hypertension        Past Surgical History:   Procedure Laterality Date   • EYE SURGERY     • HERNIA REPAIR         Family History   Problem Relation Age of Onset   • Heart attack Mother    • Heart attack Father        Social History     Socioeconomic History   • Marital status:      Spouse name: Not on file   • Number of children: Not on file   • Years of education: Not on file   • Highest education level: Not on file   Tobacco Use   • Smoking status: Former Smoker   • Smokeless tobacco: Never Used   Substance and Sexual Activity   • Alcohol use: Yes   • Drug use: No   • Sexual activity: Defer       Allergies   Allergen Reactions   • Statins Myalgia       ROS    Review of Systems   Constitutional: Negative for chills, diaphoresis, fatigue and fever.   HENT: Negative for congestion, postnasal drip and rhinorrhea.    Respiratory: Negative for cough, shortness of breath and wheezing.    Cardiovascular: Negative for chest pain and leg swelling.   Neurological: Negative for dizziness and headache.   Psychiatric/Behavioral: Negative for self-injury, sleep disturbance, suicidal ideas, depressed mood and stress. The patient is not nervous/anxious.        Vitals:    10/30/19 0940   BP: 120/64   Pulse: 63   SpO2: 99%   Weight: 88.5 kg (195 lb)   Height: 180.3 cm (71\")     Body mass index is 27.2 kg/m².    Current Outpatient Medications on File Prior to Visit   Medication Sig Dispense Refill "   • aspirin 81 MG tablet Take 81 mg by mouth Daily.     • Loratadine 10 MG capsule Take 1 tablet by mouth Daily.     • montelukast (SINGULAIR) 10 MG tablet take 1 tablet by mouth once daily 90 tablet 1   • REPATHA PUSHTRONEX SYSTEM 420 MG/3.5ML solution cartridge      • valACYclovir (VALTREX) 1000 MG tablet take 2 tablets by mouth immediately then take 2 tablets 12 hours LATER  0   • valsartan-hydrochlorothiazide (DIOVAN-HCT) 160-12.5 MG per tablet take 1/2 tablet by mouth once daily  0     No current facility-administered medications on file prior to visit.        Results for orders placed or performed in visit on 04/22/19   Hepatitis C Antibody   Result Value Ref Range    Hep C Virus Ab <0.1 0.0 - 0.9 s/co ratio   PSA Screen   Result Value Ref Range    PSA 1.450 0.000 - 4.000 ng/mL   Lipid Panel   Result Value Ref Range    Total Cholesterol 150 0 - 200 mg/dL    Triglycerides 61 0 - 150 mg/dL    HDL Cholesterol 78 (H) 40 - 60 mg/dL    VLDL Cholesterol 12.2 mg/dL    LDL Cholesterol  60 0 - 100 mg/dL   Hemoglobin A1c   Result Value Ref Range    Hemoglobin A1C 4.90 4.80 - 5.60 %   TSH   Result Value Ref Range    TSH 1.300 0.450 - 4.500 uIU/mL   Vitamin D 25 Hydroxy   Result Value Ref Range    25 Hydroxy, Vitamin D 89.4 30.0 - 100.0 ng/ml   Comprehensive Metabolic Panel   Result Value Ref Range    Glucose 94 65 - 99 mg/dL    BUN 20 8 - 23 mg/dL    Creatinine 1.16 0.76 - 1.27 mg/dL    eGFR Non African Am 63 >60 mL/min/1.73    eGFR African Am 76 >60 mL/min/1.73    BUN/Creatinine Ratio 17.2 7.0 - 25.0    Sodium 141 136 - 145 mmol/L    Potassium 4.3 3.5 - 5.2 mmol/L    Chloride 99 98 - 107 mmol/L    Total CO2 28.9 22.0 - 29.0 mmol/L    Calcium 9.9 8.6 - 10.5 mg/dL    Total Protein 7.3 6.0 - 8.5 g/dL    Albumin 4.90 3.50 - 5.20 g/dL    Globulin 2.4 gm/dL    A/G Ratio 2.0 g/dL    Total Bilirubin 0.9 0.2 - 1.2 mg/dL    Alkaline Phosphatase 59 39 - 117 U/L    AST (SGOT) 21 1 - 40 U/L    ALT (SGPT) 15 1 - 41 U/L   CBC &  Differential   Result Value Ref Range    WBC 5.85 3.40 - 10.80 10*3/mm3    RBC 5.35 4.14 - 5.80 10*6/mm3    Hemoglobin 17.4 13.0 - 17.7 g/dL    Hematocrit 52.7 (H) 37.5 - 51.0 %    MCV 98.5 (H) 79.0 - 97.0 fL    MCH 32.5 26.6 - 33.0 pg    MCHC 33.0 31.5 - 35.7 g/dL    RDW 13.3 12.3 - 15.4 %    Platelets 209 140 - 450 10*3/mm3    Neutrophil Rel % 72.6 42.7 - 76.0 %    Lymphocyte Rel % 15.6 (L) 19.6 - 45.3 %    Monocyte Rel % 9.4 5.0 - 12.0 %    Eosinophil Rel % 0.9 0.3 - 6.2 %    Basophil Rel % 1.2 0.0 - 1.5 %    Neutrophils Absolute 4.25 1.70 - 7.00 10*3/mm3    Lymphocytes Absolute 0.91 0.70 - 3.10 10*3/mm3    Monocytes Absolute 0.55 0.10 - 0.90 10*3/mm3    Eosinophils Absolute 0.05 0.00 - 0.40 10*3/mm3    Basophils Absolute 0.07 0.00 - 0.20 10*3/mm3    Immature Granulocyte Rel % 0.3 0.0 - 0.5 %    Immature Grans Absolute 0.02 0.00 - 0.05 10*3/mm3    nRBC 0.0 0.0 - 0.2 /100 WBC       PE    Physical Exam   Constitutional: He appears well-developed and well-nourished. He is active and cooperative. No distress.   HENT:   Head: Normocephalic and atraumatic.   Eyes: EOM are normal.   Neck: Normal range of motion.   Cardiovascular: Normal rate, regular rhythm and normal heart sounds.   Pulmonary/Chest: Effort normal and breath sounds normal.   Musculoskeletal: Normal range of motion.   Neurological: He is alert.   Skin: Skin is warm. He is not diaphoretic. No erythema.   Psychiatric: He has a normal mood and affect. His speech is normal and behavior is normal. Judgment and thought content normal. He is not actively hallucinating. He is attentive.   Vitals reviewed.      A/P    Omi was seen today for hypertension.    Diagnoses and all orders for this visit:    Essential hypertension  -     Comprehensive Metabolic Panel  Stable well-controlled  Compliant on current medications  Followed by cardiology    Mixed hyperlipidemia  -     Comprehensive Metabolic Panel  Currently taking Repatha  Managed by cardiology    Abnormal  CBC  -     CBC Auto Differential         Plan of care reviewed with patient at the conclusion of today's visit. Education was provided regarding diagnosis, management and any prescribed or recommended OTC medications.  Patient verbalizes understanding of and agreement with management plan.    Return in about 6 months (around 4/30/2020) for Medicare Wellness.     Sally Mann PA-C

## 2019-11-21 ENCOUNTER — TELEPHONE (OUTPATIENT)
Dept: FAMILY MEDICINE CLINIC | Facility: CLINIC | Age: 69
End: 2019-11-21

## 2019-11-21 DIAGNOSIS — I10 ESSENTIAL HYPERTENSION: Primary | ICD-10-CM

## 2019-11-21 NOTE — TELEPHONE ENCOUNTER
Please speak with patient.  Our records indicate that Dr. Ellis with cardiology prescribes this for patient.  Would recommend contacting his office for refill.

## 2019-11-27 RX ORDER — VALSARTAN AND HYDROCHLOROTHIAZIDE 160; 12.5 MG/1; MG/1
0.5 TABLET, FILM COATED ORAL DAILY
Qty: 45 TABLET | Refills: 3 | Status: SHIPPED | OUTPATIENT
Start: 2019-11-27 | End: 2020-11-25

## 2019-11-27 RX ORDER — VALSARTAN AND HYDROCHLOROTHIAZIDE 160; 12.5 MG/1; MG/1
0.5 TABLET, FILM COATED ORAL DAILY
Qty: 15 TABLET | Refills: 0 | Status: SHIPPED | OUTPATIENT
Start: 2019-11-27 | End: 2019-11-27 | Stop reason: SDUPTHER

## 2019-11-27 NOTE — TELEPHONE ENCOUNTER
"Patient LVM on [facility]        \"This is Omi marmolejo on a patient Sally Mann's. My YOB: 1950. I'm calling concerning a  prescription but I called about a week ago about having a refill and they told me they would get back with me and I haven't heard anything. Just wondering if there's a problem with getting this refill sent over to the [facility] I guess it is now at [facility] on [facility]. So if you can call me back my phone no. is [phone] again it's [phone]. Thank you.\"    "

## 2019-11-27 NOTE — TELEPHONE ENCOUNTER
Spoke with patient and he states that Dr Chandler office was the ones that originally prescribed this medication. Pt is needing this refilled. Is this okay to prescribe?    When speaking with patient I told him that I would send a short supply to use until I spoke with you so that he did not run out of medication.      Pt would like 90 day supply if we are going to prescribe for him.

## 2020-03-06 ENCOUNTER — TELEPHONE (OUTPATIENT)
Dept: FAMILY MEDICINE CLINIC | Facility: CLINIC | Age: 70
End: 2020-03-06

## 2020-03-06 NOTE — TELEPHONE ENCOUNTER
I have no clue what this is.  He will need to call the cruise line and obtain more information and/or form.

## 2020-03-06 NOTE — TELEPHONE ENCOUNTER
Patient states that he is taking a cruise and the cruise line needs a copy of a health certificate and if Sally knew what that was.  They need this within 5 days of travel.  He can be reached at [phone]

## 2020-03-07 NOTE — TELEPHONE ENCOUNTER
Called and informed pt of recommendations. He verbalized understanding and stated they found out this morning the cruise line is actually letting people cancel without any penalty so they have decided to not go on the trip and will have the full cost they paid credited so they can take the trip within the next year. Pt expressed his appreciation for us calling him back. He had no further questions.

## 2020-05-04 ENCOUNTER — E-VISIT (OUTPATIENT)
Dept: FAMILY MEDICINE CLINIC | Facility: TELEHEALTH | Age: 70
End: 2020-05-04

## 2020-05-04 PROCEDURE — 99422 OL DIG E/M SVC 11-20 MIN: CPT | Performed by: NURSE PRACTITIONER

## 2020-05-04 NOTE — PROGRESS NOTES
"Omi Owens    1950  [id number]    I have reviewed the e-Visit questionnaire and patient's answers, my assessment and plan are as follows:    Questionnaire:   --symptoms: Shortness of breath  --exposure to flu or COVID-19 in past 14 days:  No  --sudden loss of taste or smell:  Altered, but no loss  --healthcare worker:  No    HPI  Patient describes feeling of having low energy, wheezing, and slight cough over the past week. He has also noticed his taste and smell both being altered. He is also having heartburn.     Review of Systems - History obtained from the patient  General ROS: negative for - chills or fever positive for - low energy  ENT ROS: negative for - headaches, sinus pain or sore throat  Respiratory ROS: positive for - slight cough, some wheezing negative for - sputum changes  GI: positive for - taste and smell \"off\" and indigestion      There are no diagnoses linked to this encounter.    Any medications prescribed have been sent electronically to   [id number] [place], [place] - [address] AT [facility] [address]  - [phone]  - [phone]   [address] [facility] [address]  Phone: [phone] Fax: [phone]    Patient Instructions   I have sent in a prescription for Omeprazole to take daily as directed. Increase fluids and rest. May take Mucinex per 's directions for cough/congestion. May call in mouthwash after patient calls with previous prescription. If symptoms not improving or worse over next 4-5 days, re-check with primary care provider.         I have reviewed the visit questionnaire for Omi Owens and planned care through e-visit in 20 minutes.       VIANEY Hilton  05/04/20  12:27 PM        "

## 2020-05-05 RX ORDER — OMEPRAZOLE 40 MG/1
40 CAPSULE, DELAYED RELEASE ORAL DAILY
Qty: 30 CAPSULE | Refills: 0 | Status: SHIPPED | OUTPATIENT
Start: 2020-05-05 | End: 2020-06-04

## 2020-05-05 NOTE — PATIENT INSTRUCTIONS
I have sent in a prescription for Omeprazole to take daily as directed. Increase fluids and rest. May take Mucinex per 's directions for cough/congestion. May call in mouthwash after patient calls with previous prescription. If symptoms not improving or worse over next 4-5 days, re-check with primary care provider.

## 2020-05-28 ENCOUNTER — TELEPHONE (OUTPATIENT)
Dept: FAMILY MEDICINE CLINIC | Facility: CLINIC | Age: 70
End: 2020-05-28

## 2020-05-28 NOTE — TELEPHONE ENCOUNTER
Contacted patient and advise him to come in for a visit to be evaluated. He agreed to come in tomorrow at 9 am

## 2020-05-28 NOTE — TELEPHONE ENCOUNTER
PT CALLED STATING THAT HE IS HAVING A TASTE AND SMELL IN HIS MOUTH FOR OVER A MONTH. PT STATED THAT IT IS A COATING ON HIS TONGUE.  PT WANTED TO KNOW IF SOMETHING COULD BE CALLED IN.  PT STATED THAT HE HAS BEEN TREATED FOR THIS BEFORE. PT IS WILL TO BE SEEN IF NEEDED.    PT CONTACT [phone]     PHARMACY VERIFIED ATUL    PLEASE ADVISE

## 2020-05-29 ENCOUNTER — OFFICE VISIT (OUTPATIENT)
Dept: FAMILY MEDICINE CLINIC | Facility: CLINIC | Age: 70
End: 2020-05-29

## 2020-05-29 VITALS
DIASTOLIC BLOOD PRESSURE: 72 MMHG | BODY MASS INDEX: 26.46 KG/M2 | WEIGHT: 189 LBS | SYSTOLIC BLOOD PRESSURE: 130 MMHG | HEIGHT: 71 IN

## 2020-05-29 DIAGNOSIS — B37.0 THRUSH OF MOUTH AND ESOPHAGUS (HCC): Primary | ICD-10-CM

## 2020-05-29 DIAGNOSIS — B37.81 THRUSH OF MOUTH AND ESOPHAGUS (HCC): Primary | ICD-10-CM

## 2020-05-29 PROCEDURE — 99213 OFFICE O/P EST LOW 20 MIN: CPT | Performed by: PHYSICIAN ASSISTANT

## 2020-05-29 NOTE — PROGRESS NOTES
Chief Complaint   Patient presents with   • Thrush       HPI      Omi Owens is a 69 y.o. male who presents for Thrush.  Patient reports burning tongue with certain beverages, diminished taste/smell and white film on his tongue.  He has had these symptoms for the last 4 weeks.  Did use an inhaler prior to onset and did not wash his mouth out.      He has also noticed an increase in belching.  Did an e-visit and was prescribed omeprazole 40 mg daily.  This has not helped his belching.  He denies ever having any reflux issues.  Denies abdominal pain, urinary or bowel symptoms, cough, shortness of breath or fever/chills.  Allergies have been well-managed this year and he has not had any significant issues.      Past Medical History:   Diagnosis Date   • Colon polyp    • Hyperlipidemia    • Hypertension        Past Surgical History:   Procedure Laterality Date   • EYE SURGERY     • HERNIA REPAIR         Family History   Problem Relation Age of Onset   • Heart attack Mother    • Heart attack Father        Social History     Socioeconomic History   • Marital status:      Spouse name: Not on file   • Number of children: Not on file   • Years of education: Not on file   • Highest education level: Not on file   Tobacco Use   • Smoking status: Former Smoker   • Smokeless tobacco: Never Used   Substance and Sexual Activity   • Alcohol use: Yes   • Drug use: No   • Sexual activity: Defer       Allergies   Allergen Reactions   • Statins Myalgia       ROS    Review of Systems   Constitutional: Negative for chills, fatigue and fever.   HENT: Negative for congestion, ear pain, rhinorrhea, sinus pressure, sneezing, sore throat, swollen glands and trouble swallowing.         Taste/smell changes.  White film on tongue.   Respiratory: Negative for cough, shortness of breath and wheezing.    Gastrointestinal: Positive for indigestion. Negative for abdominal pain, diarrhea, nausea, vomiting and GERD.   Neurological: Negative  "for dizziness and headache.       Vitals:    05/29/20 0846   BP: 130/72   BP Location: Left arm   Patient Position: Sitting   Cuff Size: Adult   Weight: 85.7 kg (189 lb)   Height: 180.3 cm (71\")     Body mass index is 26.36 kg/m².    Current Outpatient Medications on File Prior to Visit   Medication Sig Dispense Refill   • aspirin 81 MG tablet Take 81 mg by mouth Daily.     • Loratadine 10 MG capsule Take 1 tablet by mouth Daily.     • montelukast (SINGULAIR) 10 MG tablet take 1 tablet by mouth once daily 90 tablet 1   • omeprazole (PrilOSEC) 40 MG capsule Take 1 capsule by mouth Daily for 30 days. 30 capsule 0   • REPATHA PUSHTRONEX SYSTEM 420 MG/3.5ML solution cartridge      • valsartan-hydrochlorothiazide (DIOVAN-HCT) 160-12.5 MG per tablet Take 0.5 tablets by mouth Daily. 45 tablet 3     No current facility-administered medications on file prior to visit.        Results for orders placed or performed in visit on 10/30/19   Comprehensive Metabolic Panel   Result Value Ref Range    Glucose 79 65 - 99 mg/dL    BUN 20 8 - 23 mg/dL    Creatinine 1.14 0.76 - 1.27 mg/dL    eGFR Non African Am 64 >60 mL/min/1.73    eGFR African Am 77 >60 mL/min/1.73    BUN/Creatinine Ratio 17.5 7.0 - 25.0    Sodium 141 136 - 145 mmol/L    Potassium 4.1 3.5 - 5.2 mmol/L    Chloride 100 98 - 107 mmol/L    Total CO2 27.0 22.0 - 29.0 mmol/L    Calcium 9.5 8.6 - 10.5 mg/dL    Total Protein 6.9 6.0 - 8.5 g/dL    Albumin 4.70 3.50 - 5.20 g/dL    Globulin 2.2 gm/dL    A/G Ratio 2.1 g/dL    Total Bilirubin 0.7 0.2 - 1.2 mg/dL    Alkaline Phosphatase 63 39 - 117 U/L    AST (SGOT) 21 1 - 40 U/L    ALT (SGPT) 19 1 - 41 U/L   CBC & Differential   Result Value Ref Range    WBC 6.28 3.40 - 10.80 10*3/mm3    RBC 5.13 4.14 - 5.80 10*6/mm3    Hemoglobin 16.3 13.0 - 17.7 g/dL    Hematocrit 47.7 37.5 - 51.0 %    MCV 93.0 79.0 - 97.0 fL    MCH 31.8 26.6 - 33.0 pg    MCHC 34.2 31.5 - 35.7 g/dL    RDW 13.0 12.3 - 15.4 %    Platelets 219 140 - 450 10*3/mm3    " Neutrophil Rel % 73.5 42.7 - 76.0 %    Lymphocyte Rel % 15.1 (L) 19.6 - 45.3 %    Monocyte Rel % 9.1 5.0 - 12.0 %    Eosinophil Rel % 0.8 0.3 - 6.2 %    Basophil Rel % 1.0 0.0 - 1.5 %    Neutrophils Absolute 4.62 1.70 - 7.00 10*3/mm3    Lymphocytes Absolute 0.95 0.70 - 3.10 10*3/mm3    Monocytes Absolute 0.57 0.10 - 0.90 10*3/mm3    Eosinophils Absolute 0.05 0.00 - 0.40 10*3/mm3    Basophils Absolute 0.06 0.00 - 0.20 10*3/mm3    Immature Granulocyte Rel % 0.5 0.0 - 0.5 %    Immature Grans Absolute 0.03 0.00 - 0.05 10*3/mm3    nRBC 0.0 0.0 - 0.2 /100 WBC       PE    Physical Exam   Constitutional: Vital signs are normal. He appears well-developed and well-nourished. He is active and cooperative. He does not appear ill. No distress. He is not overweight.  HENT:   Head: Normocephalic and atraumatic.   Right Ear: Hearing, tympanic membrane, external ear and ear canal normal.   Left Ear: Hearing, tympanic membrane, external ear and ear canal normal.   Mouth/Throat: Uvula is midline. No oropharyngeal exudate or posterior oropharyngeal erythema.   White-brownish coating on tongue and posterior oropharynx.   Eyes: EOM are normal.   Neck: Normal range of motion.   Musculoskeletal: Normal range of motion. He exhibits no edema.   Neurological: He is alert.   Skin: Skin is warm. He is not diaphoretic. No erythema.   Psychiatric: He has a normal mood and affect. His speech is normal and behavior is normal. Judgment and thought content normal. He is not actively hallucinating. He is attentive.        A/P    Omi was seen today for thrush.    Diagnoses and all orders for this visit:    Thrush of mouth and esophagus (CMS/HCC)  -     nystatin (MYCOSTATIN) 967145 UNIT/ML suspension; Swish and swallow 5 mL 2 (Two) Times a Day.  Mostly started secondary to inhaler use without washing mouth out.  Will trial nystatin swish and swallow.  Call if symptoms persist.       Plan of care reviewed with patient at the conclusion of today's  visit. Education was provided regarding diagnosis, management and any prescribed or recommended OTC medications.  Patient verbalizes understanding of and agreement with management plan.    No follow-ups on file.     Sally Mann PA-C    Answers for HPI/ROS submitted by the patient on 5/28/2020   What is the primary reason for your visit?: Other  Please describe your symptoms.: Taste and smell are affected. Food taste different and some smells are intense and others muted.  Seems to be a film or coating on tongue and some slight indigestion.  Have you had these symptoms before?: Yes  How long have you been having these symptoms?: Greater than 2 weeks  Please list any medications you are currently taking for this condition.: None  Please describe any probable cause for these symptoms. : Not sure what might have caused it

## 2020-06-04 DIAGNOSIS — B37.81 THRUSH OF MOUTH AND ESOPHAGUS (HCC): ICD-10-CM

## 2020-06-04 DIAGNOSIS — B37.0 THRUSH OF MOUTH AND ESOPHAGUS (HCC): ICD-10-CM

## 2020-06-10 ENCOUNTER — LAB REQUISITION (OUTPATIENT)
Dept: LAB | Facility: HOSPITAL | Age: 70
End: 2020-06-10

## 2020-06-10 ENCOUNTER — OFFICE VISIT (OUTPATIENT)
Dept: FAMILY MEDICINE CLINIC | Facility: CLINIC | Age: 70
End: 2020-06-10

## 2020-06-10 VITALS
OXYGEN SATURATION: 98 % | TEMPERATURE: 97.3 F | DIASTOLIC BLOOD PRESSURE: 80 MMHG | SYSTOLIC BLOOD PRESSURE: 126 MMHG | WEIGHT: 188 LBS | BODY MASS INDEX: 26.32 KG/M2 | HEART RATE: 60 BPM | HEIGHT: 71 IN

## 2020-06-10 DIAGNOSIS — R53.83 OTHER FATIGUE: ICD-10-CM

## 2020-06-10 DIAGNOSIS — Z13.29 SCREENING FOR THYROID DISORDER: ICD-10-CM

## 2020-06-10 DIAGNOSIS — K14.6 BURNING MOUTH SYNDROME: ICD-10-CM

## 2020-06-10 DIAGNOSIS — I10 ESSENTIAL HYPERTENSION: ICD-10-CM

## 2020-06-10 DIAGNOSIS — R63.8 OTHER SYMPTOMS AND SIGNS CONCERNING FOOD AND FLUID INTAKE: ICD-10-CM

## 2020-06-10 DIAGNOSIS — R43.2 DECREASED SENSE OF TASTE: Primary | ICD-10-CM

## 2020-06-10 DIAGNOSIS — R43.2 LOSS OF TASTE: ICD-10-CM

## 2020-06-10 DIAGNOSIS — E78.2 MIXED HYPERLIPIDEMIA: ICD-10-CM

## 2020-06-10 DIAGNOSIS — Z00.00 ROUTINE GENERAL MEDICAL EXAMINATION AT A HEALTH CARE FACILITY: ICD-10-CM

## 2020-06-10 PROCEDURE — 36415 COLL VENOUS BLD VENIPUNCTURE: CPT

## 2020-06-10 PROCEDURE — 99213 OFFICE O/P EST LOW 20 MIN: CPT | Performed by: PHYSICIAN ASSISTANT

## 2020-06-10 PROCEDURE — U0003 INFECTIOUS AGENT DETECTION BY NUCLEIC ACID (DNA OR RNA); SEVERE ACUTE RESPIRATORY SYNDROME CORONAVIRUS 2 (SARS-COV-2) (CORONAVIRUS DISEASE [COVID-19]), AMPLIFIED PROBE TECHNIQUE, MAKING USE OF HIGH THROUGHPUT TECHNOLOGIES AS DESCRIBED BY CMS-2020-01-R: HCPCS | Performed by: PHYSICIAN ASSISTANT

## 2020-06-10 NOTE — PROGRESS NOTES
Chief Complaint   Patient presents with   • Thrush     He has used the oral rinse that was prescribed last time he was seen. His sx improved while he was using this solution but it has returned as soon as he is finished with the RX. He reports that he may still be using his inhaler incorrectly which is why his thrush is not resolving       HPI      Omi Owens is a 69 y.o. male who presents for Thrush (He has used the oral rinse that was prescribed last time he was seen. His sx improved while he was using this solution but it has returned as soon as he is finished with the RX. He reports that he may still be using his inhaler incorrectly which is why his thrush is not resolving)    Patient recently seen for possible thrush with burning tongue and loss of taste.  Had used an inhaler incorrectly and symptoms started after.  Patient has now had symptoms for over 2 months.  He completed 2 courses of nystatin mouth wash without any improvement.  Denies history of any URI symptoms prior to symptom onset or currently.  No facial pain, nasal drainage, sore throat or difficulty swallowing.  Smell is still intact.  No new medications.      Patient reports that he is unable to taste any food or liquids.  He can smell the food but not taste it.  Unable to tell the difference between orange juice vs. Vodka tonic vs. Water; they all taste the same.  He denies metallic taste or difficulty swallowing.  He reports that the burning sensation of his tongue has improved.  He does not use any tobacco products.    Past Medical History:   Diagnosis Date   • Colon polyp    • Hyperlipidemia    • Hypertension        Past Surgical History:   Procedure Laterality Date   • EYE SURGERY     • HERNIA REPAIR         Family History   Problem Relation Age of Onset   • Heart attack Mother    • Heart attack Father        Social History     Socioeconomic History   • Marital status:      Spouse name: Not on file   • Number of children: Not on  "file   • Years of education: Not on file   • Highest education level: Not on file   Tobacco Use   • Smoking status: Former Smoker   • Smokeless tobacco: Never Used   Substance and Sexual Activity   • Alcohol use: Yes   • Drug use: No   • Sexual activity: Defer       Allergies   Allergen Reactions   • Statins Myalgia       ROS    Review of Systems   Constitutional: Negative for chills, diaphoresis, fatigue and fever.   HENT: Negative for congestion, ear pain, postnasal drip, rhinorrhea, sinus pressure, sneezing, sore throat, swollen glands and trouble swallowing.         Loss of taste, still can smell   Respiratory: Negative for cough, shortness of breath and wheezing.    Cardiovascular: Negative for chest pain, palpitations and leg swelling.   Gastrointestinal: Negative for diarrhea and nausea.   Skin: Negative for color change, rash and skin lesions.   Neurological: Negative for dizziness and headache.       Vitals:    06/10/20 1041   BP: 126/80   Pulse: 60   Temp: 97.3 °F (36.3 °C)   SpO2: 98%   Weight: 85.3 kg (188 lb)   Height: 180.3 cm (71\")   PainSc:   2     Body mass index is 26.22 kg/m².    Current Outpatient Medications on File Prior to Visit   Medication Sig Dispense Refill   • aspirin 81 MG tablet Take 81 mg by mouth Daily.     • Loratadine 10 MG capsule Take 1 tablet by mouth Daily.     • montelukast (SINGULAIR) 10 MG tablet take 1 tablet by mouth once daily 90 tablet 1   • REPATHA PUSHTRONEX SYSTEM 420 MG/3.5ML solution cartridge      • valsartan-hydrochlorothiazide (DIOVAN-HCT) 160-12.5 MG per tablet Take 0.5 tablets by mouth Daily. 45 tablet 3   • [DISCONTINUED] nystatin (MYCOSTATIN) 742037 UNIT/ML suspension Swish and swallow 5 mL 2 (Two) Times a Day. 60 mL 0     No current facility-administered medications on file prior to visit.        Results for orders placed or performed in visit on 10/30/19   Comprehensive Metabolic Panel   Result Value Ref Range    Glucose 79 65 - 99 mg/dL    BUN 20 8 - 23 " mg/dL    Creatinine 1.14 0.76 - 1.27 mg/dL    eGFR Non African Am 64 >60 mL/min/1.73    eGFR African Am 77 >60 mL/min/1.73    BUN/Creatinine Ratio 17.5 7.0 - 25.0    Sodium 141 136 - 145 mmol/L    Potassium 4.1 3.5 - 5.2 mmol/L    Chloride 100 98 - 107 mmol/L    Total CO2 27.0 22.0 - 29.0 mmol/L    Calcium 9.5 8.6 - 10.5 mg/dL    Total Protein 6.9 6.0 - 8.5 g/dL    Albumin 4.70 3.50 - 5.20 g/dL    Globulin 2.2 gm/dL    A/G Ratio 2.1 g/dL    Total Bilirubin 0.7 0.2 - 1.2 mg/dL    Alkaline Phosphatase 63 39 - 117 U/L    AST (SGOT) 21 1 - 40 U/L    ALT (SGPT) 19 1 - 41 U/L   CBC & Differential   Result Value Ref Range    WBC 6.28 3.40 - 10.80 10*3/mm3    RBC 5.13 4.14 - 5.80 10*6/mm3    Hemoglobin 16.3 13.0 - 17.7 g/dL    Hematocrit 47.7 37.5 - 51.0 %    MCV 93.0 79.0 - 97.0 fL    MCH 31.8 26.6 - 33.0 pg    MCHC 34.2 31.5 - 35.7 g/dL    RDW 13.0 12.3 - 15.4 %    Platelets 219 140 - 450 10*3/mm3    Neutrophil Rel % 73.5 42.7 - 76.0 %    Lymphocyte Rel % 15.1 (L) 19.6 - 45.3 %    Monocyte Rel % 9.1 5.0 - 12.0 %    Eosinophil Rel % 0.8 0.3 - 6.2 %    Basophil Rel % 1.0 0.0 - 1.5 %    Neutrophils Absolute 4.62 1.70 - 7.00 10*3/mm3    Lymphocytes Absolute 0.95 0.70 - 3.10 10*3/mm3    Monocytes Absolute 0.57 0.10 - 0.90 10*3/mm3    Eosinophils Absolute 0.05 0.00 - 0.40 10*3/mm3    Basophils Absolute 0.06 0.00 - 0.20 10*3/mm3    Immature Granulocyte Rel % 0.5 0.0 - 0.5 %    Immature Grans Absolute 0.03 0.00 - 0.05 10*3/mm3    nRBC 0.0 0.0 - 0.2 /100 WBC       PE    Physical Exam   Constitutional: Vital signs are normal. He appears well-developed and well-nourished. He is active and cooperative. He does not appear ill. No distress. He is not overweight.  HENT:   Head: Normocephalic and atraumatic.   Right Ear: Hearing, tympanic membrane, external ear and ear canal normal.   Left Ear: Hearing, tympanic membrane, external ear and ear canal normal.   Nose: Nose normal. Right sinus exhibits no maxillary sinus tenderness and no  frontal sinus tenderness. Left sinus exhibits no maxillary sinus tenderness and no frontal sinus tenderness.   Mouth/Throat: Uvula is midline, oropharynx is clear and moist and mucous membranes are normal.   Eyes: EOM are normal.   Neck: Normal range of motion.   Musculoskeletal: Normal range of motion.   Neurological: He is alert.   Skin: Skin is warm. He is not diaphoretic. No erythema.   Psychiatric: He has a normal mood and affect. His speech is normal and behavior is normal. Judgment and thought content normal. He is not actively hallucinating. He is attentive.        A/P    Omi was seen today for thrush.    Diagnoses and all orders for this visit:    Decreased sense of taste  -     TSH Rfx On Abnormal To Free T4  -     Comprehensive Metabolic Panel  -     Folate  -     Iron  -     Vitamin B12  -     Ambulatory Referral to ENT (Otolaryngology)  -     COVID-19,[facility] ROUTINE, NP/OP SWAB IN TRANSPORT MEDIA OR ESWAB 72 HR TAT - Swab, Nasopharynx; Future  Ongoing loss of taste for the last 2 months.  Retains smell.  Treated with nystatin, no improvement.  No new medications.  Has not had any URI symptoms for the last several months.  Feels completely healthy today, just loss of taste.  Will order labs and Covid screening.  Refer to ENT, Dr. Mane as patient saw him years ago and this is his preference.    Burning mouth syndrome  -     TSH Rfx On Abnormal To Free T4  -     Comprehensive Metabolic Panel  -     Folate  -     Iron  -     Vitamin B12  Reports burning has improved for the most part.  Was an early symptom, not bothering him much currently.    Essential hypertension  On valsartan-HCTZ.  Has been on this for years.  Blood pressure is stable and well-controlled.    Mixed hyperlipidemia  -     Comprehensive Metabolic Panel    Screening for thyroid disorder  Other fatigue   -     TSH Rfx On Abnormal To Free T4    Other symptoms and signs concerning food and fluid intake   -     Iron    Loss of taste  -      COVID-19,[facility] ROUTINE, NP/OP SWAB IN TRANSPORT MEDIA OR ESWAB 72 HR TAT - Swab, Nasopharynx; Future         Plan of care reviewed with patient at the conclusion of today's visit. Education was provided regarding diagnosis, management and any prescribed or recommended OTC medications.  Patient verbalizes understanding of and agreement with management plan.    No follow-ups on file.     Sally Mann PA-C

## 2020-06-11 LAB
ALBUMIN SERPL-MCNC: 4.3 G/DL (ref 3.5–5.2)
ALBUMIN/GLOB SERPL: 1.7 G/DL
ALP SERPL-CCNC: 54 U/L (ref 39–117)
ALT SERPL-CCNC: 16 U/L (ref 1–41)
AST SERPL-CCNC: 14 U/L (ref 1–40)
BILIRUB SERPL-MCNC: 0.6 MG/DL (ref 0.2–1.2)
BUN SERPL-MCNC: 21 MG/DL (ref 8–23)
BUN/CREAT SERPL: 17.9 (ref 7–25)
CALCIUM SERPL-MCNC: 9.5 MG/DL (ref 8.6–10.5)
CHLORIDE SERPL-SCNC: 104 MMOL/L (ref 98–107)
CO2 SERPL-SCNC: 29.2 MMOL/L (ref 22–29)
CREAT SERPL-MCNC: 1.17 MG/DL (ref 0.76–1.27)
FOLATE SERPL-MCNC: 7.13 NG/ML (ref 4.78–24.2)
GLOBULIN SER CALC-MCNC: 2.6 GM/DL
GLUCOSE SERPL-MCNC: 112 MG/DL (ref 65–99)
IRON SERPL-MCNC: 119 MCG/DL (ref 59–158)
POTASSIUM SERPL-SCNC: 4.6 MMOL/L (ref 3.5–5.2)
PROT SERPL-MCNC: 6.9 G/DL (ref 6–8.5)
SODIUM SERPL-SCNC: 142 MMOL/L (ref 136–145)
TSH SERPL DL<=0.005 MIU/L-ACNC: 1.44 UIU/ML (ref 0.27–4.2)
VIT B12 SERPL-MCNC: 420 PG/ML (ref 211–946)

## 2020-07-07 ENCOUNTER — OFFICE VISIT (OUTPATIENT)
Dept: FAMILY MEDICINE CLINIC | Facility: CLINIC | Age: 70
End: 2020-07-07

## 2020-07-07 VITALS
HEART RATE: 52 BPM | TEMPERATURE: 97.1 F | BODY MASS INDEX: 26.32 KG/M2 | SYSTOLIC BLOOD PRESSURE: 140 MMHG | HEIGHT: 71 IN | OXYGEN SATURATION: 99 % | DIASTOLIC BLOOD PRESSURE: 78 MMHG | WEIGHT: 188 LBS

## 2020-07-07 DIAGNOSIS — E60 ZINC DEFICIENCY: ICD-10-CM

## 2020-07-07 DIAGNOSIS — I10 ESSENTIAL HYPERTENSION: ICD-10-CM

## 2020-07-07 DIAGNOSIS — Z12.5 PROSTATE CANCER SCREENING: ICD-10-CM

## 2020-07-07 DIAGNOSIS — Z13.0 SCREENING FOR DEFICIENCY ANEMIA: ICD-10-CM

## 2020-07-07 DIAGNOSIS — R43.2 LOSS OF TASTE: ICD-10-CM

## 2020-07-07 DIAGNOSIS — Z00.00 MEDICARE ANNUAL WELLNESS VISIT, SUBSEQUENT: Primary | ICD-10-CM

## 2020-07-07 DIAGNOSIS — E78.2 MIXED HYPERLIPIDEMIA: ICD-10-CM

## 2020-07-07 DIAGNOSIS — Z00.00 PHYSICAL EXAM, ANNUAL: ICD-10-CM

## 2020-07-07 PROCEDURE — 99397 PER PM REEVAL EST PAT 65+ YR: CPT | Performed by: PHYSICIAN ASSISTANT

## 2020-07-07 PROCEDURE — 96160 PT-FOCUSED HLTH RISK ASSMT: CPT | Performed by: PHYSICIAN ASSISTANT

## 2020-07-07 PROCEDURE — G0439 PPPS, SUBSEQ VISIT: HCPCS | Performed by: PHYSICIAN ASSISTANT

## 2020-07-07 RX ORDER — VALACYCLOVIR HYDROCHLORIDE 1 G/1
TABLET, FILM COATED ORAL
COMMUNITY
Start: 2020-06-29 | End: 2020-12-03

## 2020-07-07 RX ORDER — OMEPRAZOLE 20 MG/1
CAPSULE, DELAYED RELEASE ORAL
COMMUNITY
Start: 2020-06-30 | End: 2021-07-12

## 2020-07-07 NOTE — PROGRESS NOTES
The ABCs of the Annual Wellness Visit  Subsequent Medicare Wellness Visit    Chief Complaint   Patient presents with   • Medicare Wellness-subsequent   • Loss of Taste     He reports that he followed up with ENT regarding his loss of smell and taste. He had some testing but he is still struggling with this issue and would like to discuss further.        Subjective   History of Present Illness:  Omi Owens is a 69 y.o. male who presents for a Subsequent Medicare Wellness Visit.  Patient presents for management of hypertension and hyperlipidemia.  Patient is compliant on all medications.  His blood pressure is stable and well-controlled.  Followed by cardiology.  Has been on valsartan-HCTZ for years.    Recently seen by ENT and they did not have any significant recommendations for loss of taste.  His smell is intact.  Had negative Covid-19 screening.  Was told to try acid reflux treatment to see if this helps and return for scope of throat.  Dentist treated him for thrush with no improvement, had no other suggestions.  Ongoing aggravation for patient.  Denies any other symptoms including headache, dizziness, blurred vision.  Symptoms started 4 months ago and are ongoing.  Recent labs reassuring, will check zinc as well.    HEALTH RISK ASSESSMENT    Recent Hospitalizations:  No hospitalization(s) within the last year.    Current Medical Providers:  Patient Care Team:  Sally Mann PA-C as PCP - General (Physician Assistant)    Smoking Status:  Social History     Tobacco Use   Smoking Status Former Smoker   Smokeless Tobacco Never Used       Alcohol Consumption:  Social History     Substance and Sexual Activity   Alcohol Use Yes       Depression Screen:   PHQ-2/PHQ-9 Depression Screening 7/7/2020   Little interest or pleasure in doing things 0   Feeling down, depressed, or hopeless 0   Total Score 0       Fall Risk Screen:  STEADI Fall Risk Assessment was completed, and patient is at LOW risk for  falls.Assessment completed on:7/7/2020    Health Habits and Functional and Cognitive Screening:  Functional & Cognitive Status 7/7/2020   Do you have difficulty preparing food and eating? No   Do you have difficulty bathing yourself, getting dressed or grooming yourself? No   Do you have difficulty using the toilet? No   Do you have difficulty moving around from place to place? No   Do you have trouble with steps or getting out of a bed or a chair? No   Current Diet Well Balanced Diet   Dental Exam Up to date   Eye Exam Up to date   Exercise (times per week) 4 times per week   Current Exercise Activities Include Running/Jogging   Do you need help using the phone?  No   Are you deaf or do you have serious difficulty hearing?  No   Do you need help with transportation? No   Do you need help shopping? No   Do you need help preparing meals?  No   Do you need help with housework?  No   Do you need help with laundry? No   Do you need help taking your medications? No   Do you need help managing money? No   Do you ever drive or ride in a car without wearing a seat belt? No   Have you felt unusual stress, anger or loneliness in the last month? No   Who do you live with? Spouse   If you need help, do you have trouble finding someone available to you? No   Have you been bothered in the last four weeks by sexual problems? No   Do you have difficulty concentrating, remembering or making decisions? No         Does the patient have evidence of cognitive impairment? No    Asprin use counseling:Taking ASA appropriately as indicated    Age-appropriate Screening Schedule:  Refer to the list below for future screening recommendations based on patient's age, sex and/or medical conditions. Orders for these recommended tests are listed in the plan section. The patient has been provided with a written plan.    Health Maintenance   Topic Date Due   • LIPID PANEL  04/22/2020   • COLONOSCOPY  06/16/2020   • INFLUENZA VACCINE  08/01/2020   •  TDAP/TD VACCINES (2 - Td) 02/03/2022   • ZOSTER VACCINE  Completed          The following portions of the patient's history were reviewed and updated as appropriate: allergies, current medications, past family history, past medical history, past social history, past surgical history and problem list.    Outpatient Medications Prior to Visit   Medication Sig Dispense Refill   • aspirin 81 MG tablet Take 81 mg by mouth Daily.     • Loratadine 10 MG capsule Take 1 tablet by mouth Daily.     • montelukast (SINGULAIR) 10 MG tablet take 1 tablet by mouth once daily 90 tablet 1   • omeprazole (priLOSEC) 20 MG capsule TK 1 C PO QD     • REPATHA PUSHTRONEX SYSTEM 420 MG/3.5ML solution cartridge      • valACYclovir (VALTREX) 1000 MG tablet TAKE 2 TABLETS BY MOUTH IMMEDIATELY THEN TAKE 2 TABLETS 12 HOURS LATER     • valsartan-hydrochlorothiazide (DIOVAN-HCT) 160-12.5 MG per tablet Take 0.5 tablets by mouth Daily. 45 tablet 3     No facility-administered medications prior to visit.        Patient Active Problem List   Diagnosis   • Essential hypertension   • Seasonal allergies   • Mixed hyperlipidemia   • Vitamin D deficiency       Advanced Care Planning:  ACP discussion was held with the patient during this visit. Patient does not have an advance directive, information provided.    Review of Systems   Constitutional: Negative for chills, diaphoresis, fatigue and fever.   HENT: Negative for congestion, ear pain, hearing loss, postnasal drip, rhinorrhea and sore throat.         Loss of taste   Eyes: Negative for blurred vision and pain.   Respiratory: Negative for cough, shortness of breath and wheezing.    Cardiovascular: Negative for chest pain and leg swelling.   Gastrointestinal: Negative for abdominal pain, blood in stool, constipation, diarrhea, nausea, vomiting and indigestion.   Endocrine: Negative for polyuria.   Genitourinary: Negative for dysuria, flank pain and hematuria.   Musculoskeletal: Negative for arthralgias,  "gait problem and myalgias.   Skin: Negative for rash and skin lesions.   Neurological: Negative for dizziness and headache.   Psychiatric/Behavioral: Positive for stress. Negative for self-injury, sleep disturbance, suicidal ideas and depressed mood. The patient is not nervous/anxious.        Compared to one year ago, the patient feels his physical health is the same.  Compared to one year ago, the patient feels his mental health is the same.    Reviewed chart for potential of high risk medication in the elderly: not applicable  Reviewed chart for potential of harmful drug interactions in the elderly:not applicable    Objective         Vitals:    07/07/20 0825   BP: 140/78   Pulse: 52   Temp: 97.1 °F (36.2 °C)   SpO2: 99%   Weight: 85.3 kg (188 lb)   Height: 180.3 cm (71\")   PainSc:   2       Body mass index is 26.22 kg/m².  Discussed the patient's BMI with him. The BMI is in the acceptable range.    Physical Exam   Constitutional: He is oriented to person, place, and time. Vital signs are normal. He appears well-developed and well-nourished. He is active and cooperative. He does not appear ill. No distress. He is not overweight.  HENT:   Head: Normocephalic and atraumatic.   Right Ear: Hearing, tympanic membrane, external ear and ear canal normal.   Left Ear: Hearing, tympanic membrane, external ear and ear canal normal.   Nose: Nose normal. Right sinus exhibits no maxillary sinus tenderness and no frontal sinus tenderness. Left sinus exhibits no maxillary sinus tenderness and no frontal sinus tenderness.   Mouth/Throat: Uvula is midline, oropharynx is clear and moist and mucous membranes are normal.   Eyes: Conjunctivae, EOM and lids are normal.   Neck: Trachea normal, normal range of motion and phonation normal. No thyroid mass and no thyromegaly present.   Cardiovascular: Normal rate, regular rhythm and normal heart sounds.   Pulmonary/Chest: Effort normal and breath sounds normal.   Abdominal: Soft. Normal " appearance and bowel sounds are normal. He exhibits no distension. There is no tenderness. There is no rigidity, no guarding and no CVA tenderness.   Musculoskeletal: Normal range of motion. He exhibits no edema.   Lymphadenopathy:     He has no cervical adenopathy.        Right cervical: No superficial cervical adenopathy present.       Left cervical: No superficial cervical adenopathy present.   Neurological: He is alert and oriented to person, place, and time. He has normal strength and normal reflexes. Coordination and gait normal.   CN grossly intact   Skin: Skin is warm and intact. No rash noted. He is not diaphoretic. No cyanosis or erythema. Nails show no clubbing.   Psychiatric: He has a normal mood and affect. His speech is normal and behavior is normal. Judgment and thought content normal. He is not actively hallucinating. Cognition and memory are normal. He is attentive.   Vitals reviewed.      Lab Results   Component Value Date     (H) 06/10/2020        Assessment/Plan   Medicare Risks and Personalized Health Plan  CMS Preventative Services Quick Reference  Colon Cancer Screening    The above risks/problems have been discussed with the patient.  Pertinent information has been shared with the patient in the After Visit Summary.  Follow up plans and orders are seen below in the Assessment/Plan Section.    Diagnoses and all orders for this visit:    1. Medicare annual wellness visit, subsequent (Primary)    2. Physical exam, annual  PE is unremarkable  Preventative labs ordered  Colonoscopy due, plans on scheduling in Fall  Dentist - goes regularly  Ophthalmologist - goes regularly  Dermatologist - denies any skin lesions/moles  Flu vaccine recommended in Fall, all other immunizations are up-to-date    3. Mixed hyperlipidemia  -     Lipid Panel; Future  On repatha.    4. Essential hypertension  Stable, well-controlled.  Compliant on medication.  Followed by cardiology.    5. Prostate cancer  screening  -     PSA Screen; Future    6. Screening for deficiency anemia  -     CBC Auto Differential; Future    7. Loss of taste  -     Zinc; Future  Has upcoming follow-up with ENT.  Currently trying treatment for reflux, has not helped yet.  Has been on current medications for years, nothing new.  Seen at dentist without any concerning findings or recommendations.  ENT will plan on scope if symptoms persist.  May consider CT head in future although patient has no other concerning findings.  Tested negative for Covid-19.    Follow Up:  Return in about 6 months (around 1/7/2021) for Recheck, 6 month follow-up.     An After Visit Summary and PPPS were given to the patient.             Answers for HPI/ROS submitted by the patient on 7/6/2020   What is the primary reason for your visit?: Physical

## 2020-07-07 NOTE — PATIENT INSTRUCTIONS
"Try Omron blood pressure cuff.    General Health Maintenance:  -Yearly dermatology exam  -Yearly eye exam if you are diabetic, otherwise every 2 years for patients without diabetes  -Dental exams/cleanings at least twice a year  -Staying current on your required colonoscopy, starts at age 50 unless there are other indications prior  -Prostate exam either manually in office or prostate specific antigen lab testing yearly starting at 50    Vaccines:  -Talk to pharmacist about shingrix shot  -Obtain flu shot when available  -Health department is recommending Hepatitis A vaccine    The largest impact you can have on your own health is getting the proper nutrition, exercise and maintaining an overall healthy body weight.  Proper nutrition involves eating lots of vegetables, fruit, minimal lean meat and avoiding processed foods and limiting refined sugars, carbohydrates and starches (\"the white stuff\").  Drinking at least 8 cups of water daily.  Walking at least 30 minutes a day and if possible, increasing this to a more cardiovascular aerobic exercise.  Maintaining a healthy body weight.      If you smoke or use tobacco products, quitting is extremely important and I am happy to discuss options with you regarding how to do this and what's available to assist you.    If you consume alcohol, limit your alcohol intake to 2 drinks a day for men and 1 drink a day for woman.    It is also important to make sure to keep regular appointments and have all general health maintenance items completed in a timely manner.      Thank you for entrusting me with your care.  It is a pleasure and honor to participate in your health.          "

## 2020-07-09 ENCOUNTER — LAB REQUISITION (OUTPATIENT)
Dept: LAB | Facility: HOSPITAL | Age: 70
End: 2020-07-09

## 2020-07-09 DIAGNOSIS — Z00.00 ROUTINE GENERAL MEDICAL EXAMINATION AT A HEALTH CARE FACILITY: ICD-10-CM

## 2020-07-09 PROCEDURE — 36415 COLL VENOUS BLD VENIPUNCTURE: CPT | Performed by: PHYSICIAN ASSISTANT

## 2020-07-10 LAB
BASOPHILS # BLD AUTO: 0.1 X10E3/UL (ref 0–0.2)
BASOPHILS NFR BLD AUTO: 2 %
CHOLEST SERPL-MCNC: 148 MG/DL (ref 100–199)
EOSINOPHIL # BLD AUTO: 0.1 X10E3/UL (ref 0–0.4)
EOSINOPHIL NFR BLD AUTO: 1 %
ERYTHROCYTE [DISTWIDTH] IN BLOOD BY AUTOMATED COUNT: 13.4 % (ref 11.6–15.4)
HCT VFR BLD AUTO: 48.1 % (ref 37.5–51)
HDLC SERPL-MCNC: 78 MG/DL
HGB BLD-MCNC: 15.9 G/DL (ref 13–17.7)
IMM GRANULOCYTES # BLD AUTO: 0 X10E3/UL (ref 0–0.1)
IMM GRANULOCYTES NFR BLD AUTO: 0 %
LDLC SERPL CALC-MCNC: 54 MG/DL (ref 0–99)
LYMPHOCYTES # BLD AUTO: 1 X10E3/UL (ref 0.7–3.1)
LYMPHOCYTES NFR BLD AUTO: 18 %
MCH RBC QN AUTO: 32.5 PG (ref 26.6–33)
MCHC RBC AUTO-ENTMCNC: 33.1 G/DL (ref 31.5–35.7)
MCV RBC AUTO: 98 FL (ref 79–97)
MONOCYTES # BLD AUTO: 0.4 X10E3/UL (ref 0.1–0.9)
MONOCYTES NFR BLD AUTO: 7 %
NEUTROPHILS # BLD AUTO: 4.2 X10E3/UL (ref 1.4–7)
NEUTROPHILS NFR BLD AUTO: 72 %
PLATELET # BLD AUTO: 217 X10E3/UL (ref 150–450)
PSA SERPL-MCNC: 1.5 NG/ML (ref 0–4)
RBC # BLD AUTO: 4.89 X10E6/UL (ref 4.14–5.8)
SPECIMEN STATUS: NORMAL
TRIGL SERPL-MCNC: 81 MG/DL (ref 0–149)
VLDLC SERPL CALC-MCNC: 16 MG/DL (ref 5–40)
WBC # BLD AUTO: 5.9 X10E3/UL (ref 3.4–10.8)
ZINC SERPL-MCNC: NORMAL UG/DL

## 2020-07-12 ENCOUNTER — RESULTS ENCOUNTER (OUTPATIENT)
Dept: FAMILY MEDICINE CLINIC | Facility: CLINIC | Age: 70
End: 2020-07-12

## 2020-07-12 DIAGNOSIS — R43.2 LOSS OF TASTE: ICD-10-CM

## 2020-07-13 LAB
WRITTEN AUTHORIZATION: NORMAL
ZINC BLD-MCNC: 721 UG/DL (ref 440–860)

## 2020-11-25 DIAGNOSIS — I10 ESSENTIAL HYPERTENSION: ICD-10-CM

## 2020-11-25 RX ORDER — VALSARTAN AND HYDROCHLOROTHIAZIDE 160; 12.5 MG/1; MG/1
TABLET, FILM COATED ORAL
Qty: 45 TABLET | Refills: 0 | Status: SHIPPED | OUTPATIENT
Start: 2020-11-25 | End: 2021-02-22 | Stop reason: SDUPTHER

## 2020-11-25 RX ORDER — VALSARTAN AND HYDROCHLOROTHIAZIDE 160; 12.5 MG/1; MG/1
0.5 TABLET, FILM COATED ORAL DAILY
Qty: 45 TABLET | Refills: 0 | Status: CANCELLED | OUTPATIENT
Start: 2020-11-25

## 2020-12-03 ENCOUNTER — OFFICE VISIT (OUTPATIENT)
Dept: FAMILY MEDICINE CLINIC | Facility: CLINIC | Age: 70
End: 2020-12-03

## 2020-12-03 VITALS
OXYGEN SATURATION: 99 % | HEART RATE: 78 BPM | BODY MASS INDEX: 27.3 KG/M2 | WEIGHT: 195 LBS | DIASTOLIC BLOOD PRESSURE: 74 MMHG | SYSTOLIC BLOOD PRESSURE: 132 MMHG | HEIGHT: 71 IN

## 2020-12-03 DIAGNOSIS — I10 ESSENTIAL HYPERTENSION: ICD-10-CM

## 2020-12-03 DIAGNOSIS — E78.2 MIXED HYPERLIPIDEMIA: ICD-10-CM

## 2020-12-03 DIAGNOSIS — A04.8 H. PYLORI INFECTION: Primary | ICD-10-CM

## 2020-12-03 PROCEDURE — 99213 OFFICE O/P EST LOW 20 MIN: CPT | Performed by: PHYSICIAN ASSISTANT

## 2020-12-04 NOTE — PROGRESS NOTES
Chief Complaint   Patient presents with   • Follow-up   • Hypertension   • Hyperlipidemia       HPI     Omi Owens is a pleasant 70 y.o. male who is here for routine follow-up of hypertension, hyperlipidemia, and ongoing loss of taste/smell.  Patient's blood pressure is stable and well-controlled.  Getting Repatha injections for cholesterol, recent lab work looks good.  He underwent EGD with biopsy by Dr. Pollack and was found to have H. Pylori.  He had course of treatment and felt his symptoms were improving and his taste/smell was returning.  He has since noticed that he is again having symptoms.  He is still taking omeprazole 20 mg in the morning.  He had repeat H. Pylori breath test that was negative, but patient was on the omeprazole so he is unsure how accurate this was.  Otherwise, he is doing well with no complaints.    Past Medical History:   Diagnosis Date   • Colon polyp    • Hyperlipidemia    • Hypertension        Past Surgical History:   Procedure Laterality Date   • EYE SURGERY     • HERNIA REPAIR         Family History   Problem Relation Age of Onset   • Heart attack Mother    • Heart attack Father        Social History     Socioeconomic History   • Marital status:      Spouse name: Not on file   • Number of children: Not on file   • Years of education: Not on file   • Highest education level: Not on file   Tobacco Use   • Smoking status: Former Smoker   • Smokeless tobacco: Never Used   Substance and Sexual Activity   • Alcohol use: Yes   • Drug use: No   • Sexual activity: Defer       Allergies   Allergen Reactions   • Statins Myalgia       ROS  Review of Systems   Constitutional: Negative for chills, diaphoresis, fatigue and fever.   HENT: Negative for congestion, ear pain, postnasal drip, rhinorrhea and sore throat.    Respiratory: Negative for cough, shortness of breath and wheezing.    Cardiovascular: Negative for chest pain and leg swelling.   Gastrointestinal: Positive for GERD.      "   Loss of taste/smell.   Neurological: Negative for dizziness and headache.   Psychiatric/Behavioral: Positive for stress. Negative for self-injury, sleep disturbance, suicidal ideas and depressed mood. The patient is not nervous/anxious.        Vitals:    12/03/20 0939   BP: 132/74   BP Location: Left arm   Patient Position: Sitting   Cuff Size: Adult   Pulse: 78   SpO2: 99%   Weight: 88.5 kg (195 lb)   Height: 180.3 cm (71\")     Body mass index is 27.2 kg/m².    Current Outpatient Medications on File Prior to Visit   Medication Sig Dispense Refill   • aspirin 81 MG tablet Take 81 mg by mouth Daily.     • Loratadine 10 MG capsule Take 1 tablet by mouth Daily.     • omeprazole (priLOSEC) 20 MG capsule TK 1 C PO QD     • REPATHA PUSHTRONEX SYSTEM 420 MG/3.5ML solution cartridge      • valsartan-hydrochlorothiazide (DIOVAN-HCT) 160-12.5 MG per tablet TAKE 1/2 TABLET BY MOUTH ONCE DAILY 45 tablet 0     No current facility-administered medications on file prior to visit.        Results for orders placed or performed in visit on 07/12/20   Zinc    Specimen: Blood    BLOOD   Result Value Ref Range    Zinc CANCELED ug/dL   Specimen Status Report    BLOOD   Result Value Ref Range    Specimen Status CANCELED    Zinc, Whole Blood    BLOOD   Result Value Ref Range    Zinc, Whole Blood 721 440 - 860 ug/dL   Written Authorization    BLOOD   Result Value Ref Range    Written Authorization Comment        PE    Physical Exam  Vitals signs reviewed.   Constitutional:       General: He is not in acute distress.     Appearance: Normal appearance. He is well-developed and normal weight. He is not ill-appearing or diaphoretic.   HENT:      Head: Normocephalic and atraumatic.   Eyes:      Extraocular Movements: Extraocular movements intact.      Conjunctiva/sclera: Conjunctivae normal.   Neck:      Musculoskeletal: Normal range of motion.   Cardiovascular:      Rate and Rhythm: Normal rate and regular rhythm.      Heart sounds: Normal " heart sounds.   Pulmonary:      Effort: Pulmonary effort is normal.      Breath sounds: Normal breath sounds.   Musculoskeletal: Normal range of motion.      Right lower leg: No edema.      Left lower leg: No edema.   Skin:     General: Skin is warm.      Findings: No erythema or rash.   Neurological:      General: No focal deficit present.      Mental Status: He is alert.   Psychiatric:         Attention and Perception: Attention and perception normal. He is attentive.         Mood and Affect: Mood and affect normal.         Speech: Speech normal.         Behavior: Behavior normal. Behavior is cooperative.         Thought Content: Thought content normal.         Cognition and Memory: Cognition and memory normal.         Judgment: Judgment normal.         A/P    Diagnoses and all orders for this visit:    1. H. pylori infection (Primary)  -     H. Pylori Breath Test - Breath, Lung; Future  Treated by Dr. Pollack  Hold omeprazole, repeat H. Pylori in 3 weeks    2. Essential hypertension  -     Comprehensive Metabolic Panel; Future  Stable, well-controlled  Compliant on medication    3. Mixed hyperlipidemia  Lab work from 4 months ago was normal.  On repatha       Plan of care reviewed with patient at the conclusion of today's visit. Education was provided regarding diagnosis, management and any prescribed or recommended OTC medications.  Patient verbalizes understanding of and agreement with management plan.    Return in about 7 months (around 7/12/2021) for Medicare Wellness.     Sally Mann PA-C  Answers for HPI/ROS submitted by the patient on 11/30/2020   What is the primary reason for your visit?: Other  Please describe your symptoms.: Continuation of taste issue and upset stomach.  Have you had these symptoms before?: Yes  How long have you been having these symptoms?: Greater than 2 weeks  Please list any medications you are currently taking for this condition.: Omeprazole, 20 mg.  Please describe  any probable cause for these symptoms. : Unknown

## 2020-12-22 ENCOUNTER — LAB REQUISITION (OUTPATIENT)
Dept: LAB | Facility: HOSPITAL | Age: 70
End: 2020-12-22

## 2020-12-22 ENCOUNTER — LAB (OUTPATIENT)
Dept: LAB | Facility: HOSPITAL | Age: 70
End: 2020-12-22

## 2020-12-22 DIAGNOSIS — I10 ESSENTIAL HYPERTENSION: ICD-10-CM

## 2020-12-22 DIAGNOSIS — Z00.00 ROUTINE GENERAL MEDICAL EXAMINATION AT A HEALTH CARE FACILITY: ICD-10-CM

## 2020-12-22 DIAGNOSIS — A04.8 H. PYLORI INFECTION: ICD-10-CM

## 2020-12-22 PROCEDURE — 36415 COLL VENOUS BLD VENIPUNCTURE: CPT

## 2020-12-23 LAB
ALBUMIN SERPL-MCNC: 4.5 G/DL (ref 3.5–5.2)
ALBUMIN/GLOB SERPL: 2 G/DL
ALP SERPL-CCNC: 68 U/L (ref 39–117)
ALT SERPL-CCNC: 13 U/L (ref 1–41)
AST SERPL-CCNC: 17 U/L (ref 1–40)
BILIRUB SERPL-MCNC: 0.6 MG/DL (ref 0–1.2)
BUN SERPL-MCNC: 16 MG/DL (ref 8–23)
BUN/CREAT SERPL: 14.5 (ref 7–25)
CALCIUM SERPL-MCNC: 9.2 MG/DL (ref 8.6–10.5)
CHLORIDE SERPL-SCNC: 104 MMOL/L (ref 98–107)
CO2 SERPL-SCNC: 29.5 MMOL/L (ref 22–29)
CREAT SERPL-MCNC: 1.1 MG/DL (ref 0.76–1.27)
GLOBULIN SER CALC-MCNC: 2.3 GM/DL
GLUCOSE SERPL-MCNC: 97 MG/DL (ref 65–99)
POTASSIUM SERPL-SCNC: 4.5 MMOL/L (ref 3.5–5.2)
PROT SERPL-MCNC: 6.8 G/DL (ref 6–8.5)
SODIUM SERPL-SCNC: 143 MMOL/L (ref 136–145)
UREA BREATH TEST QL: NEGATIVE

## 2021-02-22 DIAGNOSIS — I10 ESSENTIAL HYPERTENSION: ICD-10-CM

## 2021-02-22 RX ORDER — VALSARTAN AND HYDROCHLOROTHIAZIDE 160; 12.5 MG/1; MG/1
0.5 TABLET, FILM COATED ORAL DAILY
Qty: 45 TABLET | Refills: 1 | Status: SHIPPED | OUTPATIENT
Start: 2021-02-22 | End: 2021-08-16

## 2021-07-12 ENCOUNTER — OFFICE VISIT (OUTPATIENT)
Dept: FAMILY MEDICINE CLINIC | Facility: CLINIC | Age: 71
End: 2021-07-12

## 2021-07-12 VITALS
DIASTOLIC BLOOD PRESSURE: 72 MMHG | HEIGHT: 71 IN | WEIGHT: 192 LBS | SYSTOLIC BLOOD PRESSURE: 126 MMHG | HEART RATE: 56 BPM | OXYGEN SATURATION: 96 % | BODY MASS INDEX: 26.88 KG/M2

## 2021-07-12 DIAGNOSIS — Z00.00 PHYSICAL EXAM, ANNUAL: ICD-10-CM

## 2021-07-12 DIAGNOSIS — I10 ESSENTIAL HYPERTENSION: ICD-10-CM

## 2021-07-12 DIAGNOSIS — J30.2 SEASONAL ALLERGIES: ICD-10-CM

## 2021-07-12 DIAGNOSIS — K21.9 GASTROESOPHAGEAL REFLUX DISEASE, UNSPECIFIED WHETHER ESOPHAGITIS PRESENT: ICD-10-CM

## 2021-07-12 DIAGNOSIS — R00.1 BRADYCARDIA: ICD-10-CM

## 2021-07-12 DIAGNOSIS — E55.9 VITAMIN D DEFICIENCY: ICD-10-CM

## 2021-07-12 DIAGNOSIS — Z00.00 MEDICARE ANNUAL WELLNESS VISIT, SUBSEQUENT: Primary | ICD-10-CM

## 2021-07-12 DIAGNOSIS — E78.2 MIXED HYPERLIPIDEMIA: ICD-10-CM

## 2021-07-12 DIAGNOSIS — Z12.5 PROSTATE CANCER SCREENING: ICD-10-CM

## 2021-07-12 DIAGNOSIS — R43.2 LOSS OF TASTE: ICD-10-CM

## 2021-07-12 PROCEDURE — 96160 PT-FOCUSED HLTH RISK ASSMT: CPT | Performed by: PHYSICIAN ASSISTANT

## 2021-07-12 PROCEDURE — 99397 PER PM REEVAL EST PAT 65+ YR: CPT | Performed by: PHYSICIAN ASSISTANT

## 2021-07-12 PROCEDURE — 1170F FXNL STATUS ASSESSED: CPT | Performed by: PHYSICIAN ASSISTANT

## 2021-07-12 PROCEDURE — 1126F AMNT PAIN NOTED NONE PRSNT: CPT | Performed by: PHYSICIAN ASSISTANT

## 2021-07-12 PROCEDURE — G0439 PPPS, SUBSEQ VISIT: HCPCS | Performed by: PHYSICIAN ASSISTANT

## 2021-07-12 PROCEDURE — 1160F RVW MEDS BY RX/DR IN RCRD: CPT | Performed by: PHYSICIAN ASSISTANT

## 2021-07-12 RX ORDER — ASPIRIN 81 MG/1
TABLET ORAL
COMMUNITY

## 2021-07-12 RX ORDER — VALACYCLOVIR HYDROCHLORIDE 1 G/1
TABLET, FILM COATED ORAL
COMMUNITY
Start: 2021-05-17 | End: 2022-06-02 | Stop reason: SDUPTHER

## 2021-07-12 NOTE — PROGRESS NOTES
The ABCs of the Annual Wellness Visit  Subsequent Medicare Wellness Visit    Chief Complaint   Patient presents with   • Medicare Wellness-subsequent     Pt. is here for a wellness visit.        Subjective   History of Present Illness:  Omi Owens is a 70 y.o. male who presents for a Subsequent Medicare Wellness Visit.  Patient presents for routine follow-up of hypertension, hyperlipidemia, bradycardia, GERD, vitamin D deficiency and seasonal allergies.  Patient is compliant on all medications. Reports his taste is back.  He is no longer taking PPI and not having any symptoms of reflux.  He is managing allergies with OTC medications.  His blood pressure is stable and well-controlled.  Heart rate is 56.  Patient runs regularly.  He is taking a vitamin D supplement.  He goes to ophthalmologist, dentist and dermatologist regularly.  Had Covid-19 vaccination.  Due for colonoscopy, he reports having cologuard within the last 2 years.    HEALTH RISK ASSESSMENT    Recent Hospitalizations:  No hospitalization(s) within the last year.    Current Medical Providers:  Patient Care Team:  Sally Mann PA-C as PCP - General (Physician Assistant)  Pedro Ellis MD as Consulting Physician (Cardiology)    Smoking Status:  Social History     Tobacco Use   Smoking Status Former Smoker   Smokeless Tobacco Never Used       Alcohol Consumption:  Social History     Substance and Sexual Activity   Alcohol Use Yes       Depression Screen:   PHQ-2/PHQ-9 Depression Screening 7/12/2021   Little interest or pleasure in doing things 0   Feeling down, depressed, or hopeless 0   Total Score 0       Fall Risk Screen:  STEADI Fall Risk Assessment was completed, and patient is at LOW risk for falls.Assessment completed on:7/12/2021    Health Habits and Functional and Cognitive Screening:  Functional & Cognitive Status 7/12/2021   Do you have difficulty preparing food and eating? -   Do you have difficulty bathing yourself,  getting dressed or grooming yourself? -   Do you have difficulty using the toilet? -   Do you have difficulty moving around from place to place? -   Do you have trouble with steps or getting out of a bed or a chair? -   Current Diet -   Dental Exam -   Eye Exam -   Exercise (times per week) 6 times per week   Current Exercises Include Running/Jogging   Current Exercise Activities Include -   Do you need help using the phone?  No   Are you deaf or do you have serious difficulty hearing?  No   Do you need help with transportation? No   Do you need help shopping? No   Do you need help preparing meals?  No   Do you need help with housework?  No   Do you need help with laundry? No   Do you need help taking your medications? No   Do you need help managing money? No   Do you ever drive or ride in a car without wearing a seat belt? No   Have you felt unusual stress, anger or loneliness in the last month? No   Who do you live with? Spouse   If you need help, do you have trouble finding someone available to you? No   Have you been bothered in the last four weeks by sexual problems? No   Do you have difficulty concentrating, remembering or making decisions? No         Does the patient have evidence of cognitive impairment? No    Asprin use counseling:Does not need ASA but is currently taking (advised patient that ASA is not indicated and patient chooses to stop it)    Age-appropriate Screening Schedule:  Refer to the list below for future screening recommendations based on patient's age, sex and/or medical conditions. Orders for these recommended tests are listed in the plan section. The patient has been provided with a written plan.    Health Maintenance   Topic Date Due   • INFLUENZA VACCINE  10/01/2021   • TDAP/TD VACCINES (2 - Td or Tdap) 02/03/2022   • LIPID PANEL  07/12/2022   • ZOSTER VACCINE  Completed          The following portions of the patient's history were reviewed and updated as appropriate: allergies, current  medications, past family history, past medical history, past social history, past surgical history and problem list.    Outpatient Medications Prior to Visit   Medication Sig Dispense Refill   • Loratadine 10 MG capsule Take 1 tablet by mouth Daily.     • REPATHA PUSHTRONEX SYSTEM 420 MG/3.5ML solution cartridge      • valsartan-hydrochlorothiazide (DIOVAN-HCT) 160-12.5 MG per tablet Take 0.5 tablets by mouth Daily. 45 tablet 1   • aspirin 81 MG tablet Take 81 mg by mouth Daily.     • omeprazole (priLOSEC) 20 MG capsule TK 1 C PO QD     • aspirin (aspirin) 81 MG EC tablet aspirin 81 mg tablet,delayed release   Take 1 tablet every day by oral route.     • valACYclovir (VALTREX) 1000 MG tablet TAKE 2 TABLETS BY MOUTH IMMEDIATELY THEN TAKE 2 TABLETS 12 HOURS LATER       No facility-administered medications prior to visit.       Patient Active Problem List   Diagnosis   • Essential hypertension   • Seasonal allergies   • Mixed hyperlipidemia   • Vitamin D deficiency   • Bradycardia       Advanced Care Planning:  ACP discussion was held with the patient during this visit. Patient does not have an advance directive, declines further assistance.    Review of Systems   Constitutional: Negative for chills, fatigue and fever.   HENT: Negative for congestion, ear pain, rhinorrhea, sinus pain and sore throat.    Eyes: Negative for visual disturbance.   Respiratory: Negative for cough, shortness of breath and wheezing.    Cardiovascular: Negative for chest pain, palpitations and leg swelling.   Gastrointestinal: Negative for abdominal pain, blood in stool, constipation, diarrhea, nausea and vomiting.   Endocrine: Negative for polydipsia.   Genitourinary: Negative for dysuria and flank pain.   Musculoskeletal: Negative for arthralgias and myalgias.   Skin: Negative for rash.   Neurological: Negative for dizziness and headaches.   Psychiatric/Behavioral: Negative for agitation, confusion, self-injury, sleep disturbance and  "suicidal ideas. The patient is not nervous/anxious.        Compared to one year ago, the patient feels his physical health is better.  Compared to one year ago, the patient feels his mental health is better.    Reviewed chart for potential of high risk medication in the elderly: yes  Reviewed chart for potential of harmful drug interactions in the elderly:yes    Objective         Vitals:    07/12/21 0814   BP: 126/72   Pulse: 56   SpO2: 96%   Weight: 87.1 kg (192 lb)   Height: 180.3 cm (70.98\")   PainSc: 0-No pain       Body mass index is 26.79 kg/m².  Discussed the patient's BMI with him. The BMI is in the acceptable range.    Physical Exam  Vitals reviewed.   Constitutional:       General: He is not in acute distress.     Appearance: Normal appearance. He is well-developed and normal weight. He is not ill-appearing or diaphoretic.   HENT:      Head: Normocephalic and atraumatic.      Right Ear: Hearing, tympanic membrane, ear canal and external ear normal.      Left Ear: Hearing, tympanic membrane, ear canal and external ear normal.      Nose: Nose normal.      Right Sinus: No maxillary sinus tenderness or frontal sinus tenderness.      Left Sinus: No maxillary sinus tenderness or frontal sinus tenderness.      Mouth/Throat:      Pharynx: Uvula midline.   Eyes:      General: Lids are normal.      Extraocular Movements: Extraocular movements intact.      Conjunctiva/sclera: Conjunctivae normal.   Neck:      Thyroid: No thyroid mass or thyromegaly.      Trachea: Trachea and phonation normal.   Cardiovascular:      Rate and Rhythm: Normal rate and regular rhythm.      Heart sounds: Normal heart sounds.   Pulmonary:      Effort: Pulmonary effort is normal.      Breath sounds: Normal breath sounds.   Abdominal:      General: Bowel sounds are normal. There is no distension.      Palpations: Abdomen is soft. Abdomen is not rigid.      Tenderness: There is no abdominal tenderness. There is no guarding.   Musculoskeletal:  "        General: Normal range of motion.      Cervical back: Normal range of motion.      Right lower leg: No edema.      Left lower leg: No edema.   Lymphadenopathy:      Cervical: No cervical adenopathy.      Right cervical: No superficial cervical adenopathy.     Left cervical: No superficial cervical adenopathy.   Skin:     General: Skin is warm.      Findings: No erythema or rash.      Nails: There is no clubbing.   Neurological:      Mental Status: He is alert and oriented to person, place, and time.      Coordination: Coordination normal.      Gait: Gait normal.      Deep Tendon Reflexes: Reflexes are normal and symmetric.      Comments: CN grossly intact   Psychiatric:         Attention and Perception: Attention and perception normal. He is attentive.         Mood and Affect: Mood and affect normal.         Speech: Speech normal.         Behavior: Behavior normal. Behavior is cooperative.         Thought Content: Thought content normal.         Cognition and Memory: Cognition and memory normal.         Judgment: Judgment normal.         Lab Results   Component Value Date    GLU 90 07/12/2021    CHLPL 152 07/12/2021    TRIG 50 07/12/2021    HDL 71 (H) 07/12/2021    LDL 70 07/12/2021    VLDL 11 07/12/2021        Assessment/Plan   Medicare Risks and Personalized Health Plan  CMS Preventative Services Quick Reference  Advance Directive Discussion  Colon Cancer Screening  Immunizations Discussed/Encouraged (specific immunizations; COVID19 )  Prostate Cancer Screening     The above risks/problems have been discussed with the patient.  Pertinent information has been shared with the patient in the After Visit Summary.  Follow up plans and orders are seen below in the Assessment/Plan Section.    Diagnoses and all orders for this visit:    1. Medicare annual wellness visit, subsequent (Primary)  -     TSH Rfx On Abnormal To Free T4; Future    2. Physical exam, annual  PE is unremarkable  Preventative labs  ordered  Colonoscopy due, although patient states he had cologuard about 2 years ago  Dentist-goes regularly  Ophthalmologist-goes regularly  Dermatologist-goes annually  COVID-19 vaccination complete    3. Essential hypertension  -     CBC Auto Differential; Future  -     Comprehensive Metabolic Panel; Future  Blood pressure is stable and well-controlled.  Patient is compliant on medications.  He is followed by cardiology.    4. Bradycardia  Heart rate is 56 today.  Patient is an avid runner.    5. Mixed hyperlipidemia  -     Lipid Panel; Future    6. Loss of taste  Resolved.  Patient states that it has returned.  He thinks based on timeline that he possibly had Covid back in March 2020.    7. Gastroesophageal reflux disease, unspecified whether esophagitis present  Denies any issues with reflux.  Not taking a PPI anymore.    8. Seasonal allergies  Managing with OTC medications.    9. Vitamin D deficiency  -     Vitamin D 25 Hydroxy; Future    10. Prostate cancer screening  -     PSA Screen; Future      Follow Up:  Return in about 6 months (around 1/12/2022) for Recheck, 6 month follow-up.     An After Visit Summary and PPPS were given to the patient.

## 2021-07-12 NOTE — PATIENT INSTRUCTIONS
"General Health Maintenance:  -Yearly dermatology exam  -Yearly eye exam if you are diabetic, otherwise every 2 years for patients without diabetes  -Dental exams/cleanings at least twice a year  -Staying current on your required colonoscopy, starts at age 45 unless there are other indications prior  -Regular pap smears (at least every 1-3 years until age 65)  -Yearly pelvic and breast exams  -Yearly mammograms starting at age 40    Vaccines:  -Talk to pharmacist about shingrix shot  -Obtain Covid-19 vaccination when available    The largest impact you can have on your own health is getting the proper nutrition, exercise and maintaining an overall healthy body weight.  Proper nutrition involves eating lots of vegetables, fruit, minimal lean meat and avoiding processed foods and limiting refined sugars, carbohydrates and starches (\"the white stuff\").  Drinking at least 8 cups of water daily.  Walking at least 30 minutes a day and if possible, increasing this to a more cardiovascular aerobic exercise.  Maintaining a healthy body weight.      If you smoke or use tobacco products, quitting is extremely important and I am happy to discuss options with you regarding how to do this and what's available to assist you.    If you consume alcohol, limit your alcohol intake to 2 drinks a day for men and 1 drink a day for woman.    It is also important to make sure to keep regular appointments and have all general health maintenance items completed in a timely manner.      Thank you for entrusting me with your care.  It is a pleasure and honor to participate in your health.    "

## 2021-07-13 LAB
25(OH)D3+25(OH)D2 SERPL-MCNC: 92.6 NG/ML (ref 30–100)
ALBUMIN SERPL-MCNC: 4.6 G/DL (ref 3.5–5.2)
ALBUMIN/GLOB SERPL: 2.2 G/DL
ALP SERPL-CCNC: 73 U/L (ref 39–117)
ALT SERPL-CCNC: 16 U/L (ref 1–41)
AST SERPL-CCNC: 17 U/L (ref 1–40)
BASOPHILS # BLD AUTO: 0.07 10*3/MM3 (ref 0–0.2)
BASOPHILS NFR BLD AUTO: 1 % (ref 0–1.5)
BILIRUB SERPL-MCNC: 0.4 MG/DL (ref 0–1.2)
BUN SERPL-MCNC: 21 MG/DL (ref 8–23)
BUN/CREAT SERPL: 19.4 (ref 7–25)
CALCIUM SERPL-MCNC: 9.7 MG/DL (ref 8.6–10.5)
CHLORIDE SERPL-SCNC: 104 MMOL/L (ref 98–107)
CHOLEST SERPL-MCNC: 152 MG/DL (ref 0–200)
CO2 SERPL-SCNC: 28.4 MMOL/L (ref 22–29)
CREAT SERPL-MCNC: 1.08 MG/DL (ref 0.76–1.27)
EOSINOPHIL # BLD AUTO: 0.09 10*3/MM3 (ref 0–0.4)
EOSINOPHIL NFR BLD AUTO: 1.3 % (ref 0.3–6.2)
ERYTHROCYTE [DISTWIDTH] IN BLOOD BY AUTOMATED COUNT: 12.9 % (ref 12.3–15.4)
GLOBULIN SER CALC-MCNC: 2.1 GM/DL
GLUCOSE SERPL-MCNC: 90 MG/DL (ref 65–99)
HCT VFR BLD AUTO: 47.6 % (ref 37.5–51)
HDLC SERPL-MCNC: 71 MG/DL (ref 40–60)
HGB BLD-MCNC: 16.3 G/DL (ref 13–17.7)
IMM GRANULOCYTES # BLD AUTO: 0.04 10*3/MM3 (ref 0–0.05)
IMM GRANULOCYTES NFR BLD AUTO: 0.6 % (ref 0–0.5)
LDLC SERPL CALC-MCNC: 70 MG/DL (ref 0–100)
LYMPHOCYTES # BLD AUTO: 0.99 10*3/MM3 (ref 0.7–3.1)
LYMPHOCYTES NFR BLD AUTO: 14 % (ref 19.6–45.3)
MCH RBC QN AUTO: 32.2 PG (ref 26.6–33)
MCHC RBC AUTO-ENTMCNC: 34.2 G/DL (ref 31.5–35.7)
MCV RBC AUTO: 94.1 FL (ref 79–97)
MONOCYTES # BLD AUTO: 0.58 10*3/MM3 (ref 0.1–0.9)
MONOCYTES NFR BLD AUTO: 8.2 % (ref 5–12)
NEUTROPHILS # BLD AUTO: 5.28 10*3/MM3 (ref 1.7–7)
NEUTROPHILS NFR BLD AUTO: 74.9 % (ref 42.7–76)
NRBC BLD AUTO-RTO: 0 /100 WBC (ref 0–0.2)
PLATELET # BLD AUTO: 195 10*3/MM3 (ref 140–450)
POTASSIUM SERPL-SCNC: 5.2 MMOL/L (ref 3.5–5.2)
PROT SERPL-MCNC: 6.7 G/DL (ref 6–8.5)
PSA SERPL-MCNC: 1.79 NG/ML (ref 0–4)
RBC # BLD AUTO: 5.06 10*6/MM3 (ref 4.14–5.8)
SODIUM SERPL-SCNC: 141 MMOL/L (ref 136–145)
TRIGL SERPL-MCNC: 50 MG/DL (ref 0–150)
TSH SERPL DL<=0.005 MIU/L-ACNC: 1.79 UIU/ML (ref 0.27–4.2)
VLDLC SERPL CALC-MCNC: 11 MG/DL (ref 5–40)
WBC # BLD AUTO: 7.05 10*3/MM3 (ref 3.4–10.8)

## 2021-08-14 DIAGNOSIS — I10 ESSENTIAL HYPERTENSION: ICD-10-CM

## 2021-08-16 RX ORDER — VALSARTAN AND HYDROCHLOROTHIAZIDE 160; 12.5 MG/1; MG/1
TABLET, FILM COATED ORAL
Qty: 45 TABLET | Refills: 1 | Status: SHIPPED | OUTPATIENT
Start: 2021-08-16 | End: 2021-11-19

## 2021-08-16 NOTE — TELEPHONE ENCOUNTER
Rx Refill Note  Requested Prescriptions     Pending Prescriptions Disp Refills   • valsartan-hydrochlorothiazide (DIOVAN-HCT) 160-12.5 MG per tablet [Pharmacy Med Name: VALSARTAN/HCTZ 160MG/12.5MG TABLETS] 45 tablet 1     Sig: TAKE 1/2 TABLET BY MOUTH DAILY      Last office visit with prescribing clinician: 7/12/2021      Next office visit with prescribing clinician: 1/24/2022     Last labs 7/12/2021       Suellen Deluca MA  08/16/21, 09:56 EDT

## 2021-09-10 ENCOUNTER — HOSPITAL ENCOUNTER (OUTPATIENT)
Dept: GENERAL RADIOLOGY | Facility: HOSPITAL | Age: 71
Discharge: HOME | End: 2021-09-10
Admitting: PHYSICIAN ASSISTANT

## 2021-09-10 ENCOUNTER — OFFICE VISIT (OUTPATIENT)
Dept: FAMILY MEDICINE CLINIC | Facility: CLINIC | Age: 71
End: 2021-09-10

## 2021-09-10 VITALS
DIASTOLIC BLOOD PRESSURE: 80 MMHG | WEIGHT: 195 LBS | BODY MASS INDEX: 27.3 KG/M2 | HEART RATE: 52 BPM | SYSTOLIC BLOOD PRESSURE: 140 MMHG | OXYGEN SATURATION: 97 % | HEIGHT: 71 IN | TEMPERATURE: 97.6 F

## 2021-09-10 DIAGNOSIS — M25.551 RIGHT HIP PAIN: Primary | ICD-10-CM

## 2021-09-10 DIAGNOSIS — M79.604 RIGHT LEG PAIN: ICD-10-CM

## 2021-09-10 DIAGNOSIS — M25.551 RIGHT HIP PAIN: ICD-10-CM

## 2021-09-10 PROCEDURE — 99213 OFFICE O/P EST LOW 20 MIN: CPT | Performed by: PHYSICIAN ASSISTANT

## 2021-09-10 PROCEDURE — 72110 X-RAY EXAM L-2 SPINE 4/>VWS: CPT

## 2021-09-10 PROCEDURE — 73502 X-RAY EXAM HIP UNI 2-3 VIEWS: CPT

## 2021-09-10 RX ORDER — METHYLPREDNISOLONE 4 MG/1
TABLET ORAL
Qty: 21 EACH | Refills: 0 | Status: SHIPPED | OUTPATIENT
Start: 2021-09-10 | End: 2022-01-18

## 2021-09-10 NOTE — PROGRESS NOTES
"Chief Complaint   Patient presents with   • Hip Pain     Right side locking up sometimes, pain shooting through leg   • Knee Pain       HPI      Omi Owens is a 71 y.o. male who presents for Hip Pain (Right side locking up sometimes, pain shooting through leg) and Knee Pain    Patient is a very active 71-year-old gentleman.  He presents for new onset right hip pain that extends into his thigh, knee and calf.  He first noticed the pain after going for a run.  He reports that it feels like his hip is \"popping\".  The pain also radiates to his groin and this is a sharp pain.  He has been taking aleve and this helps with the pain.  Certain activities seem to aggravate his hip and make it worse at times.  He is unsure exactly which activities.  Has not been to PT, ortho or had any imaging.    Past Medical History:   Diagnosis Date   • Colon polyp    • Gastroesophageal reflux disease 7/12/2021   • Hyperlipidemia    • Hypertension    • Loss of taste 7/12/2021       Past Surgical History:   Procedure Laterality Date   • EYE SURGERY     • HERNIA REPAIR         Family History   Problem Relation Age of Onset   • Heart attack Mother    • Heart attack Father        Social History     Socioeconomic History   • Marital status:      Spouse name: Not on file   • Number of children: Not on file   • Years of education: Not on file   • Highest education level: Not on file   Tobacco Use   • Smoking status: Former Smoker   • Smokeless tobacco: Never Used   Substance and Sexual Activity   • Alcohol use: Yes   • Drug use: No   • Sexual activity: Defer       Allergies   Allergen Reactions   • Statins Myalgia       ROS    Review of Systems   Constitutional: Negative for chills and fever.   Musculoskeletal: Positive for arthralgias. Negative for back pain, gait problem and joint swelling.   Neurological: Negative for weakness and numbness.       Vitals:    09/10/21 0843   BP: 140/80   Pulse: 52   Temp: 97.6 °F (36.4 °C)   SpO2: 97% " "  Weight: 88.5 kg (195 lb)   Height: 180.3 cm (70.98\")     Body mass index is 27.21 kg/m².    Current Outpatient Medications on File Prior to Visit   Medication Sig Dispense Refill   • aspirin (aspirin) 81 MG EC tablet aspirin 81 mg tablet,delayed release   Take 1 tablet every day by oral route.     • Loratadine 10 MG capsule Take 1 tablet by mouth Daily.     • REPATHA PUSHTRONEX SYSTEM 420 MG/3.5ML solution cartridge      • valACYclovir (VALTREX) 1000 MG tablet TAKE 2 TABLETS BY MOUTH IMMEDIATELY THEN TAKE 2 TABLETS 12 HOURS LATER     • valsartan-hydrochlorothiazide (DIOVAN-HCT) 160-12.5 MG per tablet TAKE 1/2 TABLET BY MOUTH DAILY 45 tablet 1     No current facility-administered medications on file prior to visit.       Results for orders placed or performed in visit on 07/12/21   PSA Screen    Specimen: Blood    BLOOD  RELEASE TO DARRYL   Result Value Ref Range    PSA 1.790 0.000 - 4.000 ng/mL   Vitamin D 25 Hydroxy    Specimen: Blood    BLOOD  RELEASE TO DARRYL   Result Value Ref Range    25 Hydroxy, Vitamin D 92.6 30.0 - 100.0 ng/ml   Lipid Panel    Specimen: Blood    BLOOD  RELEASE TO DARRYL   Result Value Ref Range    Total Cholesterol 152 0 - 200 mg/dL    Triglycerides 50 0 - 150 mg/dL    HDL Cholesterol 71 (H) 40 - 60 mg/dL    VLDL Cholesterol Abhay 11 5 - 40 mg/dL    LDL Chol Calc (NIH) 70 0 - 100 mg/dL   TSH Rfx On Abnormal To Free T4    Specimen: Blood    BLOOD  RELEASE TO DARRYL   Result Value Ref Range    TSH 1.790 0.270 - 4.200 uIU/mL   Comprehensive Metabolic Panel    Specimen: Blood    BLOOD  RELEASE TO DARRYL   Result Value Ref Range    Glucose 90 65 - 99 mg/dL    BUN 21 8 - 23 mg/dL    Creatinine 1.08 0.76 - 1.27 mg/dL    eGFR Non African Am 68 >60 mL/min/1.73    eGFR African Am 82 >60 mL/min/1.73    BUN/Creatinine Ratio 19.4 7.0 - 25.0    Sodium 141 136 - 145 mmol/L    Potassium 5.2 3.5 - 5.2 mmol/L    Chloride 104 98 - 107 mmol/L    Total CO2 28.4 22.0 - 29.0 mmol/L    Calcium 9.7 8.6 - 10.5 mg/dL    Total " Protein 6.7 6.0 - 8.5 g/dL    Albumin 4.60 3.50 - 5.20 g/dL    Globulin 2.1 gm/dL    A/G Ratio 2.2 g/dL    Total Bilirubin 0.4 0.0 - 1.2 mg/dL    Alkaline Phosphatase 73 39 - 117 U/L    AST (SGOT) 17 1 - 40 U/L    ALT (SGPT) 16 1 - 41 U/L   CBC & Differential    BLOOD  RELEASE TO DARRYL   Result Value Ref Range    WBC 7.05 3.40 - 10.80 10*3/mm3    RBC 5.06 4.14 - 5.80 10*6/mm3    Hemoglobin 16.3 13.0 - 17.7 g/dL    Hematocrit 47.6 37.5 - 51.0 %    MCV 94.1 79.0 - 97.0 fL    MCH 32.2 26.6 - 33.0 pg    MCHC 34.2 31.5 - 35.7 g/dL    RDW 12.9 12.3 - 15.4 %    Platelets 195 140 - 450 10*3/mm3    Neutrophil Rel % 74.9 42.7 - 76.0 %    Lymphocyte Rel % 14.0 (L) 19.6 - 45.3 %    Monocyte Rel % 8.2 5.0 - 12.0 %    Eosinophil Rel % 1.3 0.3 - 6.2 %    Basophil Rel % 1.0 0.0 - 1.5 %    Neutrophils Absolute 5.28 1.70 - 7.00 10*3/mm3    Lymphocytes Absolute 0.99 0.70 - 3.10 10*3/mm3    Monocytes Absolute 0.58 0.10 - 0.90 10*3/mm3    Eosinophils Absolute 0.09 0.00 - 0.40 10*3/mm3    Basophils Absolute 0.07 0.00 - 0.20 10*3/mm3    Immature Granulocyte Rel % 0.6 (H) 0.0 - 0.5 %    Immature Grans Absolute 0.04 0.00 - 0.05 10*3/mm3    nRBC 0.0 0.0 - 0.2 /100 WBC       PE    Physical Exam  Vitals reviewed.   Constitutional:       General: He is not in acute distress.     Appearance: Normal appearance. He is well-developed and normal weight. He is not ill-appearing or diaphoretic.   HENT:      Head: Normocephalic and atraumatic.   Eyes:      Extraocular Movements: Extraocular movements intact.      Conjunctiva/sclera: Conjunctivae normal.   Pulmonary:      Effort: No respiratory distress.   Musculoskeletal:      Cervical back: Normal range of motion.      Right hip: Decreased range of motion.      Comments: Pain with hip eversion.  No pain with straight leg raise.   Neurological:      General: No focal deficit present.      Mental Status: He is alert.      Motor: Motor function is intact.      Gait: Gait is intact.      Deep Tendon  Reflexes: Reflexes are normal and symmetric.   Psychiatric:         Attention and Perception: He is attentive.         Mood and Affect: Mood normal.         Speech: Speech normal.         Behavior: Behavior normal. Behavior is cooperative.         Thought Content: Thought content normal.         Judgment: Judgment normal.          A/P    Diagnoses and all orders for this visit:    1. Right hip pain (Primary)  -     XR Hip With or Without Pelvis 2 - 3 View Right; Future  -     methylPREDNISolone (MEDROL) 4 MG dose pack; Take as directed on package instructions.  Dispense: 21 each; Refill: 0  -     Ambulatory Referral to Orthopedic Surgery    2. Right leg pain  -     XR Spine Lumbar Complete 4+VW; Future    Suspect right hip osteoarthritis.  With extension into calf may possibly be related to lumbar radiculopathy.  Will order x-ray lumbar spine and right hip.  Patient has had good relief with Aleve and therefore we will try a steroid pack.  Start referral to orthopedic surgery.  Patient requests Dr. Shah.  Will defer recommendations for possible PT, injections or surgery.    Plan of care reviewed with patient at the conclusion of today's visit. Education was provided regarding diagnosis, management and any prescribed or recommended OTC medications.  Patient verbalizes understanding of and agreement with management plan.    No follow-ups on file.     Sally Mann PA-C    Answers for HPI/ROS submitted by the patient on 9/7/2021  What is the primary reason for your visit?: Lower Extremity Injury  Incident occurred: more than 1 week ago  Injury mechanism: unknown  Pain location: right hip  Pain quality: aching  Pain - numeric: 6/10  Pain course: fluctuating

## 2021-09-13 ENCOUNTER — TELEPHONE (OUTPATIENT)
Dept: FAMILY MEDICINE CLINIC | Facility: CLINIC | Age: 71
End: 2021-09-13

## 2021-09-13 NOTE — TELEPHONE ENCOUNTER
"Spoke with patient regarding x-ray images.  Improvement in pain into leg with steroids.  \"catch\" in hip is still occurring.  Will keep appointment with Dr. Salas.  "

## 2021-09-27 ENCOUNTER — TELEPHONE (OUTPATIENT)
Dept: FAMILY MEDICINE CLINIC | Facility: CLINIC | Age: 71
End: 2021-09-27

## 2021-09-27 DIAGNOSIS — M25.551 RIGHT HIP PAIN: ICD-10-CM

## 2021-09-27 DIAGNOSIS — M54.16 LUMBAR RADICULOPATHY, RIGHT: Primary | ICD-10-CM

## 2021-09-27 NOTE — TELEPHONE ENCOUNTER
Patient is calling regarding right hip pain that is consistent of potential nerve of lower leg and would like to know if there is any other treatment possible. Can be reached at [phone].      Thank you.    Dayan GUERRERO Rep

## 2021-09-27 NOTE — TELEPHONE ENCOUNTER
Has not heard from Dr. Salas about appointment, he will call.    He is concerned this is his lower back with radiculopathy.  Will order MRI lumbar spine.

## 2021-10-04 ENCOUNTER — TELEPHONE (OUTPATIENT)
Dept: FAMILY MEDICINE CLINIC | Facility: CLINIC | Age: 71
End: 2021-10-04

## 2021-10-04 NOTE — TELEPHONE ENCOUNTER
Caller: Omi Owens    Relationship: Self    Best call back number: [phone]    What was the call regarding: PATIENT STATES THAT HE WOULD LIKE A CALL FROM THE NURSE ABOUT HIS [facility] REFERRAL. PATIENT STATES THAT HE HAS AN APPOINTMENT ON DEC THE 7TH AND NEED TO KNOW IF HE NEEDS HIS XRAY'S ON A DISK.    Do you require a callback: YES

## 2021-10-04 NOTE — TELEPHONE ENCOUNTER
Called PT, Bottom message has be resolved.Pt States is having interment pain is his sciatic nerve back pain, he can not walk  Very far with out pain.  Can you recommend anything?

## 2021-10-05 DIAGNOSIS — M54.16 LUMBAR RADICULOPATHY, RIGHT: Primary | ICD-10-CM

## 2021-10-05 RX ORDER — GABAPENTIN 300 MG/1
300 CAPSULE ORAL NIGHTLY
Qty: 30 CAPSULE | Refills: 0 | Status: SHIPPED | OUTPATIENT
Start: 2021-10-05 | End: 2021-10-28 | Stop reason: SDUPTHER

## 2021-10-05 NOTE — TELEPHONE ENCOUNTER
Spoke with patient.  Symptoms consistent with lumbar radiculopathy.  Has pending MRI scheduled.  Will trial gabapentin 300 mg nightly.

## 2021-10-28 ENCOUNTER — HOSPITAL ENCOUNTER (OUTPATIENT)
Dept: MRI IMAGING | Facility: HOSPITAL | Age: 71
Discharge: HOME | End: 2021-10-28

## 2021-10-28 ENCOUNTER — TELEPHONE (OUTPATIENT)
Dept: FAMILY MEDICINE CLINIC | Facility: CLINIC | Age: 71
End: 2021-10-28

## 2021-10-28 DIAGNOSIS — M54.16 LUMBAR RADICULOPATHY, RIGHT: ICD-10-CM

## 2021-10-28 RX ORDER — GABAPENTIN 300 MG/1
600 CAPSULE ORAL NIGHTLY
Qty: 60 CAPSULE | Refills: 0 | Status: SHIPPED | OUTPATIENT
Start: 2021-10-28 | End: 2021-12-01

## 2021-10-28 NOTE — TELEPHONE ENCOUNTER
PATIENT WAS TO HAVE A MRI TODAY BUT HE HAD A STAPEDECTOMY DONE AND WERE UNABLE TO PROCEED. THEY NEED DOCUMENTATION ON THE CHART BEFORE THEY CAN RE SCHEDULE THE MRI. A MESSAGE WAS LEFT WHERE HE GOT THE TX DONE AS WELL.

## 2021-10-28 NOTE — TELEPHONE ENCOUNTER
Dr. Lawrence Mane MD completed stapedectomy in 2007.  Patient is trying to get paperwork on procedure so he can proceed with MRI.    Increase gabapentin 600 mg nightly.  Take ibuprofen 800 mg TID and tylenol 1000 mg TID.

## 2021-10-29 DIAGNOSIS — M54.16 LUMBAR RADICULOPATHY, RIGHT: ICD-10-CM

## 2021-10-30 RX ORDER — GABAPENTIN 300 MG/1
CAPSULE ORAL
Qty: 30 CAPSULE | OUTPATIENT
Start: 2021-10-30

## 2021-10-30 NOTE — TELEPHONE ENCOUNTER
Rx Refill Note  Requested Prescriptions     Pending Prescriptions Disp Refills   • gabapentin (NEURONTIN) 300 MG capsule [Pharmacy Med Name: GABAPENTIN 300MG CAPSULES] 30 capsule      Sig: TAKE 1 CAPSULE BY MOUTH EVERY NIGHT      Last office visit with prescribing clinician: 9/10/2021      Next office visit with prescribing clinician: 1/24/2022            Suellen Deluca MA  10/30/21, 10:55 EDT     NO RECENT UDS OR CSA ON FILE    REFILL SENT 10/28/21

## 2021-11-02 NOTE — TELEPHONE ENCOUNTER
Spoke with Dr. Neri - since 1989 they have not used any materials that would cause an issue with an MRI.  Patient most likely has a titanium implant that is not concerning for MRI.  Okay to proceed with scheduling MRI.  Spoke with Taty at [phone] and she will try to obtain hardcopy of medical records with this or she will call Dr. FARNSWORTH about getting a written letter stating that patient is safe to proceed.    Please call patient and let him know that we are working on getting this taken are of.

## 2021-11-05 ENCOUNTER — HOSPITAL ENCOUNTER (OUTPATIENT)
Dept: MRI IMAGING | Facility: HOSPITAL | Age: 71
Discharge: HOME | End: 2021-11-05
Admitting: PHYSICIAN ASSISTANT

## 2021-11-05 PROCEDURE — 72148 MRI LUMBAR SPINE W/O DYE: CPT

## 2021-11-10 ENCOUNTER — TELEPHONE (OUTPATIENT)
Dept: FAMILY MEDICINE CLINIC | Facility: CLINIC | Age: 71
End: 2021-11-10

## 2021-11-10 NOTE — TELEPHONE ENCOUNTER
Spoke with patient regarding MRI.  Has appointment with Dr. Salas's office on December 7th.  Do not see anything overly concerning on lumbar MRI to explain pain, will see what ortho says.

## 2021-11-19 DIAGNOSIS — I10 ESSENTIAL HYPERTENSION: ICD-10-CM

## 2021-11-19 RX ORDER — VALSARTAN AND HYDROCHLOROTHIAZIDE 160; 12.5 MG/1; MG/1
TABLET, FILM COATED ORAL
Qty: 45 TABLET | Refills: 1 | Status: SHIPPED | OUTPATIENT
Start: 2021-11-19 | End: 2022-07-21

## 2021-11-19 NOTE — TELEPHONE ENCOUNTER
Rx Refill Note  Requested Prescriptions     Pending Prescriptions Disp Refills   • valsartan-hydrochlorothiazide (DIOVAN-HCT) 160-12.5 MG per tablet [Pharmacy Med Name: VALSARTAN/HCTZ 160MG/12.5MG TABLETS] 45 tablet 1     Sig: TAKE 1/2 TABLET BY MOUTH DAILY      Last office visit with prescribing clinician: 9/10/2021      Next office visit with prescribing clinician: 1/24/2022            MERRILL WHITNEY MA  11/19/21, 08:28 EST

## 2021-11-30 DIAGNOSIS — M54.16 LUMBAR RADICULOPATHY, RIGHT: ICD-10-CM

## 2021-12-01 RX ORDER — GABAPENTIN 300 MG/1
600 CAPSULE ORAL NIGHTLY
Qty: 60 CAPSULE | Refills: 2 | Status: SHIPPED | OUTPATIENT
Start: 2021-12-01 | End: 2022-08-08

## 2021-12-01 NOTE — TELEPHONE ENCOUNTER
Rx Refill Note  Requested Prescriptions     Pending Prescriptions Disp Refills   • gabapentin (NEURONTIN) 300 MG capsule [Pharmacy Med Name: GABAPENTIN 300MG CAPSULES] 60 capsule      Sig: TAKE 2 CAPSULES BY MOUTH EVERY NIGHT      Last office visit with prescribing clinician: 9/10/2021      Next office visit with prescribing clinician: 1/24/2022     Ladi Wright MA  12/01/21, 07:41 EST     Last fill: 10/28/2021  CSA:Not in chart  UDS:Not in chart

## 2022-01-18 ENCOUNTER — OFFICE VISIT (OUTPATIENT)
Dept: FAMILY MEDICINE CLINIC | Facility: CLINIC | Age: 72
End: 2022-01-18

## 2022-01-18 ENCOUNTER — LAB (OUTPATIENT)
Dept: LAB | Facility: HOSPITAL | Age: 72
End: 2022-01-18

## 2022-01-18 VITALS
HEIGHT: 71 IN | WEIGHT: 195.8 LBS | OXYGEN SATURATION: 97 % | SYSTOLIC BLOOD PRESSURE: 134 MMHG | BODY MASS INDEX: 27.41 KG/M2 | DIASTOLIC BLOOD PRESSURE: 74 MMHG | TEMPERATURE: 97.9 F | HEART RATE: 61 BPM

## 2022-01-18 DIAGNOSIS — R79.1 ABNORMAL COAGULATION PROFILE: ICD-10-CM

## 2022-01-18 DIAGNOSIS — M25.551 RIGHT HIP PAIN: ICD-10-CM

## 2022-01-18 DIAGNOSIS — Z01.818 PRE-OP EXAM: ICD-10-CM

## 2022-01-18 DIAGNOSIS — I10 ESSENTIAL HYPERTENSION: ICD-10-CM

## 2022-01-18 DIAGNOSIS — E78.2 MIXED HYPERLIPIDEMIA: ICD-10-CM

## 2022-01-18 DIAGNOSIS — R73.02 IMPAIRED GLUCOSE TOLERANCE: ICD-10-CM

## 2022-01-18 DIAGNOSIS — Z01.818 PRE-OP EXAM: Primary | ICD-10-CM

## 2022-01-18 PROCEDURE — 99214 OFFICE O/P EST MOD 30 MIN: CPT | Performed by: PHYSICIAN ASSISTANT

## 2022-01-18 RX ORDER — DICLOFENAC SODIUM 75 MG/1
75 TABLET, DELAYED RELEASE ORAL DAILY
COMMUNITY
Start: 2021-12-10 | End: 2022-01-18

## 2022-01-18 NOTE — PROGRESS NOTES
Chief Complaint   Patient presents with   • Pre-op Exam     w/ labs       HPI     Omi Owens is a pleasant 71 y.o. male who is here for routine pre-operative examination for upcoming right hip replacement on  by Dr. Salas.  Patient has been having episodic pain for the last 8-10 months.  He has pain at rest and with activity.  The pain is limiting his quality of life.  He has no history of MI, DVT, PE or CVA.  He is taking aspirin 81 mg and NSAIDs prn for pain.  He stopped these given upcoming surgery.  He denies any cough, chest pain or urinary symptoms today.  Overall feels good except for hip pain.  His blood pressure is stable and well-controlled.  On Repatha.  Established with cardiology, Dr. Ellis, and already received surgical clearance.    Past Medical History:   Diagnosis Date   • Colon polyp    • Gastroesophageal reflux disease 2021   • Hyperlipidemia    • Hypertension    • Loss of taste 2021       Past Surgical History:   Procedure Laterality Date   • CORONARY STENT PLACEMENT     • EYE SURGERY     • HERNIA REPAIR         Family History   Problem Relation Age of Onset   • Heart attack Mother    • Heart attack Father    • Hyperlipidemia Father        Social History     Socioeconomic History   • Marital status:    Tobacco Use   • Smoking status: Former Smoker     Packs/day: 0.25     Years: 5.00     Pack years: 1.25     Types: Cigarettes     Start date: 1969     Quit date: 1974     Years since quittin.0   • Smokeless tobacco: Never Used   Vaping Use   • Vaping Use: Never used   Substance and Sexual Activity   • Alcohol use: Yes     Alcohol/week: 12.0 standard drinks     Types: 12 Standard drinks or equivalent per week   • Drug use: No   • Sexual activity: Yes     Partners: Female       Allergies   Allergen Reactions   • Statins Myalgia       ROS  Review of Systems   Constitutional: Negative for chills, diaphoresis, fatigue and fever.   HENT: Negative  "for congestion, postnasal drip and rhinorrhea.    Respiratory: Negative for cough, shortness of breath and wheezing.    Cardiovascular: Negative for chest pain and leg swelling.   Gastrointestinal: Negative for abdominal pain.   Genitourinary: Negative for dysuria, flank pain and hematuria.   Musculoskeletal: Positive for arthralgias and gait problem.   Neurological: Negative for dizziness, weakness, numbness and headache.   Psychiatric/Behavioral: Negative for self-injury, sleep disturbance, suicidal ideas, depressed mood and stress. The patient is not nervous/anxious.        Vitals:    01/18/22 1353   BP: 134/74   BP Location: Right arm   Patient Position: Sitting   Cuff Size: Adult   Pulse: 61   Temp: 97.9 °F (36.6 °C)   SpO2: 97%   Weight: 88.8 kg (195 lb 12.8 oz)   Height: 180.3 cm (70.98\")   PainSc:   4     Body mass index is 27.32 kg/m².    Current Outpatient Medications on File Prior to Visit   Medication Sig Dispense Refill   • aspirin (aspirin) 81 MG EC tablet aspirin 81 mg tablet,delayed release   Take 1 tablet every day by oral route.     • gabapentin (NEURONTIN) 300 MG capsule TAKE 2 CAPSULES BY MOUTH EVERY NIGHT 60 capsule 2   • Loratadine 10 MG capsule Take 1 tablet by mouth Daily.     • REPATHA PUSHTRONEX SYSTEM 420 MG/3.5ML solution cartridge      • valACYclovir (VALTREX) 1000 MG tablet TAKE 2 TABLETS BY MOUTH IMMEDIATELY THEN TAKE 2 TABLETS 12 HOURS LATER     • valsartan-hydrochlorothiazide (DIOVAN-HCT) 160-12.5 MG per tablet TAKE 1/2 TABLET BY MOUTH DAILY 45 tablet 1   • [DISCONTINUED] diclofenac (VOLTAREN) 75 MG EC tablet Take 75 mg by mouth Daily. with food.     • [DISCONTINUED] methylPREDNISolone (MEDROL) 4 MG dose pack Take as directed on package instructions. 21 each 0     No current facility-administered medications on file prior to visit.       Results for orders placed or performed in visit on 07/12/21   PSA Screen    Specimen: Blood    BLOOD  RELEASE TO DARRYL   Result Value Ref Range    " PSA 1.790 0.000 - 4.000 ng/mL   Vitamin D 25 Hydroxy    Specimen: Blood    BLOOD  RELEASE TO DARRYL   Result Value Ref Range    25 Hydroxy, Vitamin D 92.6 30.0 - 100.0 ng/ml   Lipid Panel    Specimen: Blood    BLOOD  RELEASE TO DARRYL   Result Value Ref Range    Total Cholesterol 152 0 - 200 mg/dL    Triglycerides 50 0 - 150 mg/dL    HDL Cholesterol 71 (H) 40 - 60 mg/dL    VLDL Cholesterol Abhay 11 5 - 40 mg/dL    LDL Chol Calc (NIH) 70 0 - 100 mg/dL   TSH Rfx On Abnormal To Free T4    Specimen: Blood    BLOOD  RELEASE TO DARRYL   Result Value Ref Range    TSH 1.790 0.270 - 4.200 uIU/mL   Comprehensive Metabolic Panel    Specimen: Blood    BLOOD  RELEASE TO DARRYL   Result Value Ref Range    Glucose 90 65 - 99 mg/dL    BUN 21 8 - 23 mg/dL    Creatinine 1.08 0.76 - 1.27 mg/dL    eGFR Non African Am 68 >60 mL/min/1.73    eGFR African Am 82 >60 mL/min/1.73    BUN/Creatinine Ratio 19.4 7.0 - 25.0    Sodium 141 136 - 145 mmol/L    Potassium 5.2 3.5 - 5.2 mmol/L    Chloride 104 98 - 107 mmol/L    Total CO2 28.4 22.0 - 29.0 mmol/L    Calcium 9.7 8.6 - 10.5 mg/dL    Total Protein 6.7 6.0 - 8.5 g/dL    Albumin 4.60 3.50 - 5.20 g/dL    Globulin 2.1 gm/dL    A/G Ratio 2.2 g/dL    Total Bilirubin 0.4 0.0 - 1.2 mg/dL    Alkaline Phosphatase 73 39 - 117 U/L    AST (SGOT) 17 1 - 40 U/L    ALT (SGPT) 16 1 - 41 U/L   CBC & Differential    BLOOD  RELEASE TO DARRYL   Result Value Ref Range    WBC 7.05 3.40 - 10.80 10*3/mm3    RBC 5.06 4.14 - 5.80 10*6/mm3    Hemoglobin 16.3 13.0 - 17.7 g/dL    Hematocrit 47.6 37.5 - 51.0 %    MCV 94.1 79.0 - 97.0 fL    MCH 32.2 26.6 - 33.0 pg    MCHC 34.2 31.5 - 35.7 g/dL    RDW 12.9 12.3 - 15.4 %    Platelets 195 140 - 450 10*3/mm3    Neutrophil Rel % 74.9 42.7 - 76.0 %    Lymphocyte Rel % 14.0 (L) 19.6 - 45.3 %    Monocyte Rel % 8.2 5.0 - 12.0 %    Eosinophil Rel % 1.3 0.3 - 6.2 %    Basophil Rel % 1.0 0.0 - 1.5 %    Neutrophils Absolute 5.28 1.70 - 7.00 10*3/mm3    Lymphocytes Absolute 0.99 0.70 - 3.10  10*3/mm3    Monocytes Absolute 0.58 0.10 - 0.90 10*3/mm3    Eosinophils Absolute 0.09 0.00 - 0.40 10*3/mm3    Basophils Absolute 0.07 0.00 - 0.20 10*3/mm3    Immature Granulocyte Rel % 0.6 (H) 0.0 - 0.5 %    Immature Grans Absolute 0.04 0.00 - 0.05 10*3/mm3    nRBC 0.0 0.0 - 0.2 /100 WBC       PE    Physical Exam  Vitals reviewed.   Constitutional:       General: He is not in acute distress.     Appearance: Normal appearance. He is well-developed and normal weight. He is not ill-appearing or diaphoretic.   HENT:      Head: Normocephalic and atraumatic.   Eyes:      Extraocular Movements: Extraocular movements intact.      Conjunctiva/sclera: Conjunctivae normal.   Cardiovascular:      Rate and Rhythm: Normal rate and regular rhythm.      Heart sounds: Normal heart sounds.   Pulmonary:      Effort: Pulmonary effort is normal.      Breath sounds: Normal breath sounds.   Musculoskeletal:         General: Normal range of motion.      Cervical back: Normal range of motion.      Right lower leg: No edema.      Left lower leg: No edema.   Skin:     General: Skin is warm.      Findings: No erythema or rash.   Neurological:      General: No focal deficit present.      Mental Status: He is alert.   Psychiatric:         Attention and Perception: He is attentive.         Mood and Affect: Mood normal.         Speech: Speech normal.         Behavior: Behavior normal. Behavior is cooperative.         Thought Content: Thought content normal.         Judgment: Judgment normal.         A/P    Diagnoses and all orders for this visit:    1. Pre-op exam (Primary)  2. Right hip pain  -     aPTT; Future  -     CBC Auto Differential; Future  -     Comprehensive Metabolic Panel; Future  -     Protime-INR; Future  -     Hemoglobin A1c; Future  Upcoming right hip replacement on 01/26/2022 with Dr. Salas.  Will order preoperative lab work.  ECG and surgical clearance already completed by Dr. Ellis.  Patient is holding aspirin and  NSAIDs prior to surgery.    3. Essential hypertension  Stable, well-controlled.  Compliant on medication.    4. Mixed hyperlipidemia  On repatha.    5. Impaired glucose tolerance  -     Hemoglobin A1c; Future    6. Abnormal coagulation profile   -     aPTT; Future  -     Protime-INR; Future     We discussed the patient's very low risk for major cardiovascular complications (MI, ventricular arrhythmia, cardiac arrest, pulmonary edema, complete heart block) pertaining to surgery per the Revised Cardiac Risk Index (0.4%-Darian Criteria) and that complications that can arise even in healthy individuals. There are no additional steps to take in order to reduce current surgical risk. The patient verbalizes understanding and agrees to proceed as planned.  Patient has also received surgical clearance from his cardiologist, Dr. Ellis.        Plan of care reviewed with patient at the conclusion of today's visit. Education was provided regarding diagnosis, management and any prescribed or recommended OTC medications.  Patient verbalizes understanding of and agreement with management plan.    Return in about 6 months (around 7/25/2022) for Medicare Wellness.     Sally Mann PA-C

## 2022-01-19 LAB
ALBUMIN SERPL-MCNC: 4.6 G/DL (ref 3.5–5.2)
ALBUMIN/GLOB SERPL: 1.9 G/DL
ALP SERPL-CCNC: 79 U/L (ref 39–117)
ALT SERPL-CCNC: 15 U/L (ref 1–41)
APTT PPP: 33.6 SECONDS (ref 22–39)
AST SERPL-CCNC: 15 U/L (ref 1–40)
BASOPHILS # BLD AUTO: 0.07 10*3/MM3 (ref 0–0.2)
BASOPHILS NFR BLD AUTO: 0.8 % (ref 0–1.5)
BILIRUB SERPL-MCNC: 0.4 MG/DL (ref 0–1.2)
BUN SERPL-MCNC: 19 MG/DL (ref 8–23)
BUN/CREAT SERPL: 18.4 (ref 7–25)
CALCIUM SERPL-MCNC: 9.5 MG/DL (ref 8.6–10.5)
CHLORIDE SERPL-SCNC: 101 MMOL/L (ref 98–107)
CO2 SERPL-SCNC: 29.9 MMOL/L (ref 22–29)
CREAT SERPL-MCNC: 1.03 MG/DL (ref 0.76–1.27)
EOSINOPHIL # BLD AUTO: 0.07 10*3/MM3 (ref 0–0.4)
EOSINOPHIL NFR BLD AUTO: 0.8 % (ref 0.3–6.2)
ERYTHROCYTE [DISTWIDTH] IN BLOOD BY AUTOMATED COUNT: 12.7 % (ref 12.3–15.4)
GLOBULIN SER CALC-MCNC: 2.4 GM/DL
GLUCOSE SERPL-MCNC: 97 MG/DL (ref 65–99)
HBA1C MFR BLD: 5.1 % (ref 4.8–5.6)
HCT VFR BLD AUTO: 47 % (ref 37.5–51)
HGB BLD-MCNC: 16.2 G/DL (ref 13–17.7)
IMM GRANULOCYTES # BLD AUTO: 0.02 10*3/MM3 (ref 0–0.05)
IMM GRANULOCYTES NFR BLD AUTO: 0.2 % (ref 0–0.5)
INR PPP: 0.94 (ref 0.85–1.16)
LYMPHOCYTES # BLD AUTO: 1.01 10*3/MM3 (ref 0.7–3.1)
LYMPHOCYTES NFR BLD AUTO: 11.4 % (ref 19.6–45.3)
MCH RBC QN AUTO: 32.5 PG (ref 26.6–33)
MCHC RBC AUTO-ENTMCNC: 34.5 G/DL (ref 31.5–35.7)
MCV RBC AUTO: 94.4 FL (ref 79–97)
MONOCYTES # BLD AUTO: 0.67 10*3/MM3 (ref 0.1–0.9)
MONOCYTES NFR BLD AUTO: 7.6 % (ref 5–12)
NEUTROPHILS # BLD AUTO: 7.02 10*3/MM3 (ref 1.7–7)
NEUTROPHILS NFR BLD AUTO: 79.2 % (ref 42.7–76)
NRBC BLD AUTO-RTO: 0 /100 WBC (ref 0–0.2)
PLATELET # BLD AUTO: 216 10*3/MM3 (ref 140–450)
POTASSIUM SERPL-SCNC: 3.8 MMOL/L (ref 3.5–5.2)
PROT SERPL-MCNC: 7 G/DL (ref 6–8.5)
PROTHROMBIN TIME: 12.3 SECONDS (ref 11.4–14.4)
RBC # BLD AUTO: 4.98 10*6/MM3 (ref 4.14–5.8)
SODIUM SERPL-SCNC: 139 MMOL/L (ref 136–145)
WBC # BLD AUTO: 8.86 10*3/MM3 (ref 3.4–10.8)

## 2022-03-20 ENCOUNTER — HOSPITAL ENCOUNTER (EMERGENCY)
Facility: HOSPITAL | Age: 72
Discharge: HOME | End: 2022-03-20
Attending: EMERGENCY MEDICINE | Admitting: EMERGENCY MEDICINE

## 2022-03-20 ENCOUNTER — APPOINTMENT (OUTPATIENT)
Dept: GENERAL RADIOLOGY | Facility: HOSPITAL | Age: 72
End: 2022-03-20

## 2022-03-20 VITALS
WEIGHT: 185 LBS | HEIGHT: 71 IN | TEMPERATURE: 97.1 F | HEART RATE: 78 BPM | RESPIRATION RATE: 16 BRPM | DIASTOLIC BLOOD PRESSURE: 94 MMHG | BODY MASS INDEX: 25.9 KG/M2 | SYSTOLIC BLOOD PRESSURE: 159 MMHG | OXYGEN SATURATION: 99 %

## 2022-03-20 DIAGNOSIS — M19.90 ARTHRITIS: ICD-10-CM

## 2022-03-20 DIAGNOSIS — M79.645 THUMB PAIN, LEFT: Primary | ICD-10-CM

## 2022-03-20 DIAGNOSIS — M79.89 SWELLING OF THUMB, LEFT: ICD-10-CM

## 2022-03-20 LAB
ALBUMIN SERPL-MCNC: 4.5 G/DL (ref 3.5–5.2)
ALBUMIN/GLOB SERPL: 1.9 G/DL
ALP SERPL-CCNC: 85 U/L (ref 39–117)
ALT SERPL W P-5'-P-CCNC: 12 U/L (ref 1–41)
ANION GAP SERPL CALCULATED.3IONS-SCNC: 11 MMOL/L (ref 5–15)
AST SERPL-CCNC: 15 U/L (ref 1–40)
BASOPHILS # BLD AUTO: 0.04 10*3/MM3 (ref 0–0.2)
BASOPHILS NFR BLD AUTO: 0.3 % (ref 0–1.5)
BILIRUB SERPL-MCNC: 0.6 MG/DL (ref 0–1.2)
BUN SERPL-MCNC: 16 MG/DL (ref 8–23)
BUN/CREAT SERPL: 15 (ref 7–25)
CALCIUM SPEC-SCNC: 9.2 MG/DL (ref 8.6–10.5)
CHLORIDE SERPL-SCNC: 103 MMOL/L (ref 98–107)
CO2 SERPL-SCNC: 26 MMOL/L (ref 22–29)
CREAT SERPL-MCNC: 1.07 MG/DL (ref 0.76–1.27)
CRP SERPL-MCNC: 1.41 MG/DL (ref 0–0.5)
D-LACTATE SERPL-SCNC: 2.6 MMOL/L (ref 0.5–2)
DEPRECATED RDW RBC AUTO: 44.1 FL (ref 37–54)
EGFRCR SERPLBLD CKD-EPI 2021: 74.7 ML/MIN/1.73
EOSINOPHIL # BLD AUTO: 0.01 10*3/MM3 (ref 0–0.4)
EOSINOPHIL NFR BLD AUTO: 0.1 % (ref 0.3–6.2)
ERYTHROCYTE [DISTWIDTH] IN BLOOD BY AUTOMATED COUNT: 12.7 % (ref 12.3–15.4)
ERYTHROCYTE [SEDIMENTATION RATE] IN BLOOD: 7 MM/HR (ref 0–20)
GLOBULIN UR ELPH-MCNC: 2.4 GM/DL
GLUCOSE SERPL-MCNC: 163 MG/DL (ref 65–99)
HCT VFR BLD AUTO: 44.4 % (ref 37.5–51)
HGB BLD-MCNC: 15.1 G/DL (ref 13–17.7)
IMM GRANULOCYTES # BLD AUTO: 0.04 10*3/MM3 (ref 0–0.05)
IMM GRANULOCYTES NFR BLD AUTO: 0.3 % (ref 0–0.5)
LYMPHOCYTES # BLD AUTO: 0.55 10*3/MM3 (ref 0.7–3.1)
LYMPHOCYTES NFR BLD AUTO: 4.6 % (ref 19.6–45.3)
MCH RBC QN AUTO: 32.1 PG (ref 26.6–33)
MCHC RBC AUTO-ENTMCNC: 34 G/DL (ref 31.5–35.7)
MCV RBC AUTO: 94.3 FL (ref 79–97)
MONOCYTES # BLD AUTO: 0.74 10*3/MM3 (ref 0.1–0.9)
MONOCYTES NFR BLD AUTO: 6.2 % (ref 5–12)
NEUTROPHILS NFR BLD AUTO: 10.48 10*3/MM3 (ref 1.7–7)
NEUTROPHILS NFR BLD AUTO: 88.5 % (ref 42.7–76)
NRBC BLD AUTO-RTO: 0 /100 WBC (ref 0–0.2)
PLATELET # BLD AUTO: 183 10*3/MM3 (ref 140–450)
PMV BLD AUTO: 10.4 FL (ref 6–12)
POTASSIUM SERPL-SCNC: 3.6 MMOL/L (ref 3.5–5.2)
PROCALCITONIN SERPL-MCNC: 0.04 NG/ML (ref 0–0.25)
PROT SERPL-MCNC: 6.9 G/DL (ref 6–8.5)
RBC # BLD AUTO: 4.71 10*6/MM3 (ref 4.14–5.8)
SODIUM SERPL-SCNC: 140 MMOL/L (ref 136–145)
URATE SERPL-MCNC: 3.8 MG/DL (ref 3.4–7)
WBC NRBC COR # BLD: 11.86 10*3/MM3 (ref 3.4–10.8)

## 2022-03-20 PROCEDURE — 83605 ASSAY OF LACTIC ACID: CPT | Performed by: NURSE PRACTITIONER

## 2022-03-20 PROCEDURE — 84145 PROCALCITONIN (PCT): CPT | Performed by: NURSE PRACTITIONER

## 2022-03-20 PROCEDURE — 73140 X-RAY EXAM OF FINGER(S): CPT

## 2022-03-20 PROCEDURE — 85652 RBC SED RATE AUTOMATED: CPT | Performed by: NURSE PRACTITIONER

## 2022-03-20 PROCEDURE — 99283 EMERGENCY DEPT VISIT LOW MDM: CPT

## 2022-03-20 PROCEDURE — 86140 C-REACTIVE PROTEIN: CPT | Performed by: NURSE PRACTITIONER

## 2022-03-20 PROCEDURE — 84550 ASSAY OF BLOOD/URIC ACID: CPT | Performed by: NURSE PRACTITIONER

## 2022-03-20 PROCEDURE — 80053 COMPREHEN METABOLIC PANEL: CPT | Performed by: NURSE PRACTITIONER

## 2022-03-20 PROCEDURE — 85025 COMPLETE CBC W/AUTO DIFF WBC: CPT | Performed by: NURSE PRACTITIONER

## 2022-03-20 RX ORDER — SODIUM CHLORIDE 0.9 % (FLUSH) 0.9 %
10 SYRINGE (ML) INJECTION AS NEEDED
Status: DISCONTINUED | OUTPATIENT
Start: 2022-03-20 | End: 2022-03-20 | Stop reason: HOSPADM

## 2022-03-20 NOTE — DISCHARGE INSTRUCTIONS
I spoke to Guillermo who is a physician assistant with [facility] he recommended calling the office tomorrow and he can see you in the office or Dr. Garcia with her hand surgeon is also in the clinic.  Please call early for an appointment.  Advised the office staff that the ER doctor spoke to Guillermo and they wanted you to be seen.

## 2022-03-20 NOTE — ED PROVIDER NOTES
Subjective   Left thumb pain and swelling for several days. Pt typically drains swelling with tb needle for years. Has done it around 8-10 times in the past. Worsening swelling after doing that recently. Seen by [facility] and started on bactrim bid and keflex tid but getting worse. Right hip replacement several months ago by Dr. Hartman.      Hand Pain  Location:  Left thumb  Severity:  Moderate  Onset quality:  Gradual  Timing:  Constant  Progression:  Worsening  Chronicity:  New  Relieved by:  Rest  Worsened by:  Movement  Associated symptoms: no abdominal pain, no chest pain, no congestion, no cough, no diarrhea, no fever, no nausea, no shortness of breath, no sore throat, no vomiting and no wheezing        Review of Systems   Constitutional: Negative for chills, diaphoresis and fever.   HENT: Negative for congestion and sore throat.    Respiratory: Negative for cough, choking, chest tightness, shortness of breath and wheezing.    Cardiovascular: Negative for chest pain and leg swelling.   Gastrointestinal: Negative for abdominal distention, abdominal pain, anal bleeding, blood in stool, constipation, diarrhea, nausea and vomiting.   Genitourinary: Negative for difficulty urinating, dysuria, flank pain, frequency, hematuria and urgency.   All other systems reviewed and are negative.      Past Medical History:   Diagnosis Date   • Colon polyp    • Gastroesophageal reflux disease 7/12/2021   • Hyperlipidemia    • Hypertension    • Loss of taste 7/12/2021       Allergies   Allergen Reactions   • Statins Myalgia       Past Surgical History:   Procedure Laterality Date   • CORONARY STENT PLACEMENT  2015   • EYE SURGERY     • HERNIA REPAIR         Family History   Problem Relation Age of Onset   • Heart attack Mother    • Heart attack Father    • Hyperlipidemia Father        Social History     Socioeconomic History   • Marital status:    Tobacco Use   • Smoking status: Former Smoker     Packs/day: 0.25     Years:  5.00     Pack years: 1.25     Types: Cigarettes     Start date: 1969     Quit date: 1974     Years since quittin.2   • Smokeless tobacco: Never Used   Vaping Use   • Vaping Use: Never used   Substance and Sexual Activity   • Alcohol use: Yes     Alcohol/week: 12.0 standard drinks     Types: 12 Standard drinks or equivalent per week   • Drug use: No   • Sexual activity: Yes     Partners: Female           Objective   Physical Exam  Constitutional:       Appearance: He is well-developed.   HENT:      Head: Normocephalic and atraumatic.      Right Ear: External ear normal.      Left Ear: External ear normal.      Nose: Nose normal.   Eyes:      Conjunctiva/sclera: Conjunctivae normal.      Pupils: Pupils are equal, round, and reactive to light.   Cardiovascular:      Rate and Rhythm: Normal rate and regular rhythm.      Heart sounds: Normal heart sounds.   Pulmonary:      Effort: Pulmonary effort is normal.      Breath sounds: Normal breath sounds.   Abdominal:      General: Bowel sounds are normal.      Palpations: Abdomen is soft.   Musculoskeletal:         General: Normal range of motion.      Cervical back: Normal range of motion and neck supple.      Comments: Left thumb swelling. Painful rom but full. No pointing. No flexor tendon pain.     No tenderness along the flexor sheath    Finger is not slightly flexed at rest    There is no pain along the tendon with passive extension   Skin:     General: Skin is warm and dry.   Neurological:      Mental Status: He is alert and oriented to person, place, and time.   Psychiatric:         Behavior: Behavior normal.         Judgment: Judgment normal.         Procedures           ED Course  ED Course as of 22 1403   Sun Mar 20, 2022   1250 [place] with [facility] paged []   5381 I spoke with physician assistant Johnson and advised of the findings.  Recommended to call the office tomorrow to be seen.  Either he can see the patient or Dr. Jose evans  hand surgeon can see the patient.  Patient well aware the signs of worsening issue.  All thankful and agreeable.  At this point I do not feel that this is emergently surgical as this has been going on for several days.  He has good range of motion and and there is no inability to sleep related to it.  There is also no palpable abscess.  Labs overall reassuring with clinical exam.  Obviously my differential is abscess infected arthritis etc.  I think it is reasonable to stay on his antibiotics and see his orthopedic doctor tomorrow since it is close follow-up.  I do not see a clear reason to emergently transfer heart him to  at this time patient agrees and he is agreeable to the plan.  Well aware the signs and worse condition. [JM]   1400 POC with Dr. Carroll.  [JM]   1400 I had a very long sitdown conversation with the patient.  I think this is a very good plan.  He has close follow-up tomorrow he is already on antibiotics.  His [place] signs are essentially negative.  Nothing to drain into tonight and he has close follow-up tomorrow with a good orthopedic group and hand surgeon if needed.  The patient is thankful and agreeable.  I did discuss with the patient about being sent to  tonight however [facility] is most likely on divert and with the patient having such close follow-up tomorrow it is reasonable to keep his appointment with established doctors tomorrow.  Nothing to drain tonight.  Patient well aware the signs and worse condition.  All thankful and agreeable with the plan. [JM]      ED Course User Index  [JM] Rashi Liao, VIANEY                                                 [facility]    Final diagnoses:   Thumb pain, left   Arthritis   Swelling of thumb, left       ED Disposition  ED Disposition     ED Disposition   Discharge    Condition   Stable    Comment   --             Johnson Kirkpatrick PA  [facility]  [address] [facility] [address] [phone]    Schedule an appointment as soon as  possible for a visit on 3/21/2022      Pedro Garcia Jr., MD  [address] [address]  [phone]    On 3/21/2022           Medication List      No changes were made to your prescriptions during this visit.          Rashi Liao, VIANEY  03/20/22 [id number]       Rashi Liao APRN  03/20/22 4327

## 2022-06-02 ENCOUNTER — OFFICE VISIT (OUTPATIENT)
Dept: FAMILY MEDICINE CLINIC | Facility: CLINIC | Age: 72
End: 2022-06-02

## 2022-06-02 VITALS
DIASTOLIC BLOOD PRESSURE: 80 MMHG | BODY MASS INDEX: 27.1 KG/M2 | HEART RATE: 67 BPM | SYSTOLIC BLOOD PRESSURE: 118 MMHG | WEIGHT: 193.6 LBS | TEMPERATURE: 98.7 F | HEIGHT: 71 IN | OXYGEN SATURATION: 97 %

## 2022-06-02 DIAGNOSIS — B00.1 RECURRENT COLD SORES: ICD-10-CM

## 2022-06-02 DIAGNOSIS — M25.552 LEFT HIP PAIN: Primary | ICD-10-CM

## 2022-06-02 PROBLEM — M25.551 RIGHT HIP PAIN: Status: RESOLVED | Noted: 2021-09-10 | Resolved: 2022-06-02

## 2022-06-02 PROCEDURE — 99214 OFFICE O/P EST MOD 30 MIN: CPT | Performed by: PHYSICIAN ASSISTANT

## 2022-06-02 RX ORDER — VALACYCLOVIR HYDROCHLORIDE 1 G/1
TABLET, FILM COATED ORAL
Qty: 30 TABLET | Refills: 0 | Status: SHIPPED | OUTPATIENT
Start: 2022-06-02

## 2022-06-03 NOTE — PROGRESS NOTES
Chief Complaint   Patient presents with   • Hip Pain     Left   • Blister     Lip       HPI      Omi Owens is a 71 y.o. male who presents for Hip Pain (Left) and Blister (Lip).  Patient had right hip replacement by Dr. Salas a few months ago and is doing well.  He has new onset left hip pain.  The pain has been ongoing for the last few weeks.  He reports groin pain and left buttocks pain.  Pain is worse with onset of activity, pain improves after time.  Taking OTC NSAIDs as needed and this helps.    Patient also reports episodic cold sores on his lips.  Currently has sore that is healing.  Previously took valacyclovir but is unsure how effective this was.  Doesn't have any more valacyclovir.       Past Medical History:   Diagnosis Date   • Colon polyp    • Gastroesophageal reflux disease 2021   • Hyperlipidemia    • Hypertension    • Loss of taste 2021   • Right hip pain 9/10/2021       Past Surgical History:   Procedure Laterality Date   • CORONARY STENT PLACEMENT     • EYE SURGERY     • HERNIA REPAIR         Family History   Problem Relation Age of Onset   • Heart attack Mother    • Heart attack Father    • Hyperlipidemia Father        Social History     Socioeconomic History   • Marital status:    Tobacco Use   • Smoking status: Former Smoker     Packs/day: 0.25     Years: 5.00     Pack years: 1.25     Types: Cigarettes, Cigarettes     Start date: 1969     Quit date: 1974     Years since quittin.4   • Smokeless tobacco: Never Used   Vaping Use   • Vaping Use: Never used   Substance and Sexual Activity   • Alcohol use: Yes     Alcohol/week: 12.0 standard drinks     Types: 12 Drinks containing 0.5 oz of alcohol per week   • Drug use: No   • Sexual activity: Yes     Partners: Female       Allergies   Allergen Reactions   • Statins Myalgia       ROS    Review of Systems   Musculoskeletal: Positive for arthralgias and myalgias. Negative for gait problem.   Skin:        Cold  "sore   Neurological: Negative for weakness and numbness.       Vitals:    06/02/22 1218   BP: 118/80   BP Location: Left arm   Patient Position: Sitting   Cuff Size: Adult   Pulse: 67   Temp: 98.7 °F (37.1 °C)   SpO2: 97%   Weight: 87.8 kg (193 lb 9.6 oz)   Height: 180.3 cm (70.98\")   PainSc:   4     Body mass index is 27.01 kg/m².    Current Outpatient Medications on File Prior to Visit   Medication Sig Dispense Refill   • aspirin 81 MG EC tablet aspirin 81 mg tablet,delayed release   Take 1 tablet every day by oral route.     • gabapentin (NEURONTIN) 300 MG capsule TAKE 2 CAPSULES BY MOUTH EVERY NIGHT 60 capsule 2   • Loratadine 10 MG capsule Take 1 tablet by mouth Daily.     • REPATHA PUSHTRONEX SYSTEM 420 MG/3.5ML solution cartridge      • valsartan-hydrochlorothiazide (DIOVAN-HCT) 160-12.5 MG per tablet TAKE 1/2 TABLET BY MOUTH DAILY 45 tablet 1   • [DISCONTINUED] valACYclovir (VALTREX) 1000 MG tablet TAKE 2 TABLETS BY MOUTH IMMEDIATELY THEN TAKE 2 TABLETS 12 HOURS LATER       No current facility-administered medications on file prior to visit.       Results for orders placed or performed during the hospital encounter of 03/20/22   Comprehensive Metabolic Panel    Specimen: Blood   Result Value Ref Range    Glucose 163 (H) 65 - 99 mg/dL    BUN 16 8 - 23 mg/dL    Creatinine 1.07 0.76 - 1.27 mg/dL    Sodium 140 136 - 145 mmol/L    Potassium 3.6 3.5 - 5.2 mmol/L    Chloride 103 98 - 107 mmol/L    CO2 26.0 22.0 - 29.0 mmol/L    Calcium 9.2 8.6 - 10.5 mg/dL    Total Protein 6.9 6.0 - 8.5 g/dL    Albumin 4.50 3.50 - 5.20 g/dL    ALT (SGPT) 12 1 - 41 U/L    AST (SGOT) 15 1 - 40 U/L    Alkaline Phosphatase 85 39 - 117 U/L    Total Bilirubin 0.6 0.0 - 1.2 mg/dL    Globulin 2.4 gm/dL    A/G Ratio 1.9 g/dL    BUN/Creatinine Ratio 15.0 7.0 - 25.0    Anion Gap 11.0 5.0 - 15.0 mmol/L    eGFR 74.7 >60.0 mL/min/1.73   Lactic Acid, Plasma    Specimen: Blood   Result Value Ref Range    Lactate 2.6 (C) 0.5 - 2.0 mmol/L "   Procalcitonin    Specimen: Blood   Result Value Ref Range    Procalcitonin 0.04 0.00 - 0.25 ng/mL   C-reactive Protein    Specimen: Blood   Result Value Ref Range    C-Reactive Protein 1.41 (H) 0.00 - 0.50 mg/dL   Sedimentation Rate    Specimen: Blood   Result Value Ref Range    Sed Rate 7 0 - 20 mm/hr   CBC Auto Differential    Specimen: Blood   Result Value Ref Range    WBC 11.86 (H) 3.40 - 10.80 10*3/mm3    RBC 4.71 4.14 - 5.80 10*6/mm3    Hemoglobin 15.1 13.0 - 17.7 g/dL    Hematocrit 44.4 37.5 - 51.0 %    MCV 94.3 79.0 - 97.0 fL    MCH 32.1 26.6 - 33.0 pg    MCHC 34.0 31.5 - 35.7 g/dL    RDW 12.7 12.3 - 15.4 %    RDW-SD 44.1 37.0 - 54.0 fl    MPV 10.4 6.0 - 12.0 fL    Platelets 183 140 - 450 10*3/mm3    Neutrophil % 88.5 (H) 42.7 - 76.0 %    Lymphocyte % 4.6 (L) 19.6 - 45.3 %    Monocyte % 6.2 5.0 - 12.0 %    Eosinophil % 0.1 (L) 0.3 - 6.2 %    Basophil % 0.3 0.0 - 1.5 %    Immature Grans % 0.3 0.0 - 0.5 %    Neutrophils, Absolute 10.48 (H) 1.70 - 7.00 10*3/mm3    Lymphocytes, Absolute 0.55 (L) 0.70 - 3.10 10*3/mm3    Monocytes, Absolute 0.74 0.10 - 0.90 10*3/mm3    Eosinophils, Absolute 0.01 0.00 - 0.40 10*3/mm3    Basophils, Absolute 0.04 0.00 - 0.20 10*3/mm3    Immature Grans, Absolute 0.04 0.00 - 0.05 10*3/mm3    nRBC 0.0 0.0 - 0.2 /100 WBC   Uric Acid    Specimen: Blood   Result Value Ref Range    Uric Acid 3.8 3.4 - 7.0 mg/dL       PE    Physical Exam  Vitals reviewed.   Constitutional:       General: He is not in acute distress.     Appearance: Normal appearance. He is well-developed and normal weight. He is not ill-appearing or diaphoretic.   HENT:      Head: Normocephalic and atraumatic.      Mouth/Throat:     Eyes:      Extraocular Movements: Extraocular movements intact.      Conjunctiva/sclera: Conjunctivae normal.   Pulmonary:      Effort: No respiratory distress.   Musculoskeletal:         General: Normal range of motion.      Cervical back: Normal range of motion.        Legs:    Neurological:       General: No focal deficit present.      Mental Status: He is alert.   Psychiatric:         Attention and Perception: He is attentive.         Mood and Affect: Mood normal.         Speech: Speech normal.         Behavior: Behavior normal. Behavior is cooperative.         Thought Content: Thought content normal.         Judgment: Judgment normal.          A/P    Diagnoses and all orders for this visit:    1. Left hip pain (Primary)  -     diclofenac (VOLTAREN) 50 MG EC tablet; Take 1 tablet by mouth 2 (Two) Times a Day With Meals.  Dispense: 60 tablet; Refill: 5  Recommend contacting Dr. Salas's office and scheduling appointment.  They can obtain x-ray, consider injection and determine when and if surgery is recommended.  Will trial diclofenac 50 mg twice daily.    2. Recurrent cold sores  -     valACYclovir (VALTREX) 1000 MG tablet; Take 2 tablets by mouth immediately then take 2 tablets 12 hours later, continue 1 tablet twice daily until cold sore resolves  Dispense: 30 tablet; Refill: 0         Plan of care reviewed with patient at the conclusion of today's visit. Education was provided regarding diagnosis, management and any prescribed or recommended OTC medications.  Patient verbalizes understanding of and agreement with management plan.    No follow-ups on file.     Sally Mann PA-C    Answers for HPI/ROS submitted by the patient on 5/31/2022  What is the primary reason for your visit?: Other  Please describe your symptoms.: After recovering from hip joint replacement on my right hip I am now having pain in my left hip. It’s painful to walk.  Have you had these symptoms before?: No  How long have you been having these symptoms?: Greater than 2 weeks  Please list any medications you are currently taking for this condition.: None other than Advil  Please describe any probable cause for these symptoms. : Not sure.

## 2022-07-21 DIAGNOSIS — I10 ESSENTIAL HYPERTENSION: ICD-10-CM

## 2022-07-21 RX ORDER — VALSARTAN AND HYDROCHLOROTHIAZIDE 160; 12.5 MG/1; MG/1
TABLET, FILM COATED ORAL
Qty: 45 TABLET | Refills: 1 | Status: SHIPPED | OUTPATIENT
Start: 2022-07-21 | End: 2023-03-11

## 2022-07-21 NOTE — TELEPHONE ENCOUNTER
Rx Refill Note  Requested Prescriptions     Pending Prescriptions Disp Refills   • valsartan-hydrochlorothiazide (DIOVAN-HCT) 160-12.5 MG per tablet [Pharmacy Med Name: VALSARTAN/HCTZ 160MG/12.5MG TABLETS] 45 tablet 1     Sig: TAKE 1/2 TABLET BY MOUTH DAILY      Last office visit with prescribing clinician: 6/2/2022      Next office visit with prescribing clinician: 8/8/2022            Maggi Campbell MA  07/21/22, 15:26 EDT

## 2022-08-08 ENCOUNTER — OFFICE VISIT (OUTPATIENT)
Dept: FAMILY MEDICINE CLINIC | Facility: CLINIC | Age: 72
End: 2022-08-08

## 2022-08-08 ENCOUNTER — LAB (OUTPATIENT)
Dept: LAB | Facility: HOSPITAL | Age: 72
End: 2022-08-08

## 2022-08-08 VITALS
OXYGEN SATURATION: 96 % | SYSTOLIC BLOOD PRESSURE: 134 MMHG | HEART RATE: 60 BPM | WEIGHT: 194.8 LBS | HEIGHT: 71 IN | BODY MASS INDEX: 27.27 KG/M2 | DIASTOLIC BLOOD PRESSURE: 82 MMHG | TEMPERATURE: 98.1 F

## 2022-08-08 DIAGNOSIS — I10 ESSENTIAL HYPERTENSION: ICD-10-CM

## 2022-08-08 DIAGNOSIS — Z12.5 PROSTATE CANCER SCREENING: ICD-10-CM

## 2022-08-08 DIAGNOSIS — M25.552 LEFT HIP PAIN: ICD-10-CM

## 2022-08-08 DIAGNOSIS — Z00.00 MEDICARE ANNUAL WELLNESS VISIT, SUBSEQUENT: Primary | ICD-10-CM

## 2022-08-08 DIAGNOSIS — E78.2 MIXED HYPERLIPIDEMIA: ICD-10-CM

## 2022-08-08 DIAGNOSIS — Z00.00 PHYSICAL EXAM, ANNUAL: ICD-10-CM

## 2022-08-08 LAB
ALBUMIN SERPL-MCNC: 4.6 G/DL (ref 3.5–5.2)
ALBUMIN/GLOB SERPL: 2.3 G/DL
ALP SERPL-CCNC: 87 U/L (ref 39–117)
ALT SERPL-CCNC: 16 U/L (ref 1–41)
AST SERPL-CCNC: 18 U/L (ref 1–40)
BILIRUB SERPL-MCNC: 0.3 MG/DL (ref 0–1.2)
BUN SERPL-MCNC: 23 MG/DL (ref 8–23)
BUN/CREAT SERPL: 22.8 (ref 7–25)
CALCIUM SERPL-MCNC: 9.4 MG/DL (ref 8.6–10.5)
CHLORIDE SERPL-SCNC: 101 MMOL/L (ref 98–107)
CO2 SERPL-SCNC: 28 MMOL/L (ref 22–29)
CREAT SERPL-MCNC: 1.01 MG/DL (ref 0.76–1.27)
EGFRCR SERPLBLD CKD-EPI 2021: 79.5 ML/MIN/1.73
GLOBULIN SER CALC-MCNC: 2 GM/DL
GLUCOSE SERPL-MCNC: 94 MG/DL (ref 65–99)
POTASSIUM SERPL-SCNC: 4.3 MMOL/L (ref 3.5–5.2)
PROT SERPL-MCNC: 6.6 G/DL (ref 6–8.5)
PSA SERPL-MCNC: 1.97 NG/ML (ref 0–4)
SODIUM SERPL-SCNC: 142 MMOL/L (ref 136–145)

## 2022-08-08 PROCEDURE — 99397 PER PM REEVAL EST PAT 65+ YR: CPT | Performed by: PHYSICIAN ASSISTANT

## 2022-08-08 PROCEDURE — 1170F FXNL STATUS ASSESSED: CPT | Performed by: PHYSICIAN ASSISTANT

## 2022-08-08 PROCEDURE — G0439 PPPS, SUBSEQ VISIT: HCPCS | Performed by: PHYSICIAN ASSISTANT

## 2022-08-08 PROCEDURE — 1159F MED LIST DOCD IN RCRD: CPT | Performed by: PHYSICIAN ASSISTANT

## 2022-08-08 PROCEDURE — 1126F AMNT PAIN NOTED NONE PRSNT: CPT | Performed by: PHYSICIAN ASSISTANT

## 2022-08-08 PROCEDURE — 96160 PT-FOCUSED HLTH RISK ASSMT: CPT | Performed by: PHYSICIAN ASSISTANT

## 2022-08-08 NOTE — PROGRESS NOTES
The ABCs of the Annual Wellness Visit  Subsequent Medicare Wellness Visit    Chief Complaint   Patient presents with   • Medicare Wellness-subsequent   • Annual Exam      Subjective    History of Present Illness:  Omi Owens is a 72 y.o. male who presents for a Subsequent Medicare Wellness Visit.    The following portions of the patient's history were reviewed and   updated as appropriate: allergies, current medications, past family history, past medical history, past social history, past surgical history and problem list.    Compared to one year ago, the patient feels his physical   health is the same.    Compared to one year ago, the patient feels his mental   health is the same.    Recent Hospitalizations:  He was not admitted to the hospital during the last year.       Current Medical Providers:  Patient Care Team:  Sally Mann PA-C as PCP - General (Physician Assistant)  Pedro Ellis MD as Consulting Physician (Cardiology)  Papo Salas MD as Consulting Physician (Orthopedic Surgery)    Outpatient Medications Prior to Visit   Medication Sig Dispense Refill   • aspirin 81 MG EC tablet aspirin 81 mg tablet,delayed release   Take 1 tablet every day by oral route.     • diclofenac (VOLTAREN) 50 MG EC tablet Take 1 tablet by mouth 2 (Two) Times a Day With Meals. 60 tablet 5   • Loratadine 10 MG capsule Take 1 tablet by mouth Daily.     • REPATHA PUSHTRONEX SYSTEM 420 MG/3.5ML solution cartridge      • valACYclovir (VALTREX) 1000 MG tablet Take 2 tablets by mouth immediately then take 2 tablets 12 hours later, continue 1 tablet twice daily until cold sore resolves 30 tablet 0   • valsartan-hydrochlorothiazide (DIOVAN-HCT) 160-12.5 MG per tablet TAKE 1/2 TABLET BY MOUTH DAILY 45 tablet 1   • gabapentin (NEURONTIN) 300 MG capsule TAKE 2 CAPSULES BY MOUTH EVERY NIGHT 60 capsule 2     No facility-administered medications prior to visit.       No opioid medication identified on  active medication list. I have reviewed chart for other potential  high risk medication/s and harmful drug interactions in the elderly.          Aspirin is on active medication list. Aspirin use is indicated based on review of current medical condition/s. Pros and cons of this therapy have been discussed today. Benefits of this medication outweigh potential harm.  Patient has been encouraged to continue taking this medication.  .      Patient Active Problem List   Diagnosis   • Essential hypertension   • Seasonal allergies   • Mixed hyperlipidemia   • Vitamin D deficiency   • Bradycardia   • Left hip pain   • Recurrent cold sores     Advance Care Planning  Advance Directive is not on file.  ACP discussion was declined by the patient. Patient does not have an advance directive, declines further assistance.    Review of Systems   Constitutional: Negative for chills, fatigue and fever.   HENT: Negative for congestion, ear pain, postnasal drip, rhinorrhea, sinus pressure and sore throat.    Eyes: Negative for visual disturbance.   Respiratory: Negative for cough, shortness of breath and wheezing.    Cardiovascular: Negative for chest pain, palpitations and leg swelling.   Gastrointestinal: Negative for abdominal pain, blood in stool, constipation, diarrhea, nausea and vomiting.   Endocrine: Negative for polyuria.   Genitourinary: Negative for decreased urine volume, flank pain, hematuria and urgency.   Musculoskeletal: Positive for arthralgias and myalgias. Negative for gait problem.   Skin: Negative for rash.   Neurological: Negative for dizziness, weakness, numbness and confusion.   Psychiatric/Behavioral: Negative for agitation, dysphoric mood, self-injury, sleep disturbance and suicidal ideas. The patient is not nervous/anxious.         Objective    Vitals:    08/08/22 0801   BP: 134/82   BP Location: Left arm   Patient Position: Sitting   Cuff Size: Adult   Pulse: 60   Temp: 98.1 °F (36.7 °C)   SpO2: 96%   Weight:  "88.4 kg (194 lb 12.8 oz)   Height: 180.3 cm (70.98\")   PainSc: 0-No pain     Estimated body mass index is 27.18 kg/m² as calculated from the following:    Height as of this encounter: 180.3 cm (70.98\").    Weight as of this encounter: 88.4 kg (194 lb 12.8 oz).    BMI is >= 25 and <30. (Overweight) The following options were offered after discussion;: exercise counseling/recommendations and nutrition counseling/recommendations      Does the patient have evidence of cognitive impairment? No    Physical Exam  Vitals reviewed.   Constitutional:       General: He is not in acute distress.     Appearance: Normal appearance. He is well-developed and normal weight. He is not ill-appearing or diaphoretic.   HENT:      Head: Normocephalic and atraumatic.      Right Ear: Hearing, tympanic membrane, ear canal and external ear normal.      Left Ear: Hearing, tympanic membrane, ear canal and external ear normal.      Nose: Nose normal.      Right Sinus: No maxillary sinus tenderness or frontal sinus tenderness.      Left Sinus: No maxillary sinus tenderness or frontal sinus tenderness.      Mouth/Throat:      Pharynx: Uvula midline.   Eyes:      General: Lids are normal.      Extraocular Movements: Extraocular movements intact.      Conjunctiva/sclera: Conjunctivae normal.   Neck:      Thyroid: No thyroid mass or thyromegaly.      Trachea: Trachea and phonation normal.   Cardiovascular:      Rate and Rhythm: Normal rate and regular rhythm.      Heart sounds: Normal heart sounds.   Pulmonary:      Effort: Pulmonary effort is normal.      Breath sounds: Normal breath sounds.   Abdominal:      General: Bowel sounds are normal. There is no distension.      Palpations: Abdomen is soft. Abdomen is not rigid.      Tenderness: There is no abdominal tenderness. There is no guarding.   Musculoskeletal:         General: Normal range of motion.      Cervical back: Normal range of motion.      Right lower leg: No edema.      Left lower leg: " No edema.   Lymphadenopathy:      Cervical: No cervical adenopathy.      Right cervical: No superficial cervical adenopathy.     Left cervical: No superficial cervical adenopathy.   Skin:     General: Skin is warm.      Findings: No erythema or rash.      Nails: There is no clubbing.   Neurological:      Mental Status: He is alert and oriented to person, place, and time.      Coordination: Coordination normal.      Gait: Gait normal.      Deep Tendon Reflexes: Reflexes are normal and symmetric.      Comments: CN grossly intact   Psychiatric:         Attention and Perception: Attention and perception normal. He is attentive.         Mood and Affect: Mood and affect normal.         Speech: Speech normal.         Behavior: Behavior normal. Behavior is cooperative.         Thought Content: Thought content normal.         Cognition and Memory: Cognition and memory normal.         Judgment: Judgment normal.                 HEALTH RISK ASSESSMENT    Smoking Status:  Social History     Tobacco Use   Smoking Status Former Smoker   • Packs/day: 0.25   • Years: 5.00   • Pack years: 1.25   • Types: Cigarettes, Cigarettes   • Start date: 1969   • Quit date: 1974   • Years since quittin.6   Smokeless Tobacco Never Used     Alcohol Consumption:  Social History     Substance and Sexual Activity   Alcohol Use Yes   • Alcohol/week: 12.0 standard drinks   • Types: 12 Drinks containing 0.5 oz of alcohol per week     Fall Risk Screen:    Count includes the [facility] Fall Risk Assessment was completed, and patient is at LOW risk for falls.Assessment completed on:2022    Depression Screening:  PHQ-2/PHQ-9 Depression Screening 2022   Retired PHQ-9 Total Score -   Retired Total Score -   Little Interest or Pleasure in Doing Things 0-->not at all   Feeling Down, Depressed or Hopeless 0-->not at all   PHQ-9: Brief Depression Severity Measure Score 0       Health Habits and Functional and Cognitive Screening:  Functional & Cognitive Status  8/8/2022   Do you have difficulty preparing food and eating? No   Do you have difficulty bathing yourself, getting dressed or grooming yourself? No   Do you have difficulty using the toilet? No   Do you have difficulty moving around from place to place? No   Do you have trouble with steps or getting out of a bed or a chair? No   Current Diet Well Balanced Diet   Dental Exam Up to date   Eye Exam Up to date   Exercise (times per week) 5 times per week   Current Exercises Include Walking;Swimming   Current Exercise Activities Include -   Do you need help using the phone?  No   Are you deaf or do you have serious difficulty hearing?  No   Do you need help with transportation? No   Do you need help shopping? No   Do you need help preparing meals?  No   Do you need help with housework?  No   Do you need help with laundry? No   Do you need help taking your medications? No   Do you need help managing money? No   Do you ever drive or ride in a car without wearing a seat belt? No   Have you felt unusual stress, anger or loneliness in the last month? -   Who do you live with? -   If you need help, do you have trouble finding someone available to you? -   Have you been bothered in the last four weeks by sexual problems? -   Do you have difficulty concentrating, remembering or making decisions? -       Age-appropriate Screening Schedule:  Refer to the list below for future screening recommendations based on patient's age, sex and/or medical conditions. Orders for these recommended tests are listed in the plan section. The patient has been provided with a written plan.    Health Maintenance   Topic Date Due   • TDAP/TD VACCINES (2 - Td or Tdap) 02/03/2022   • INFLUENZA VACCINE  10/01/2022   • LIPID PANEL  04/28/2023   • ZOSTER VACCINE  Completed              Assessment & Plan   CMS Preventative Services Quick Reference  Risk Factors Identified During Encounter  Cardiovascular Disease  Immunizations Discussed/Encouraged (specific  Immunizations; Td, Influenza and COVID19  preventative screenings  The above risks/problems have been discussed with the patient.  Follow up actions/plans if indicated are seen below in the Assessment/Plan Section.  Pertinent information has been shared with the patient in the After Visit Summary.    Diagnoses and all orders for this visit:    1. Medicare annual wellness visit, subsequent (Primary)    2. Physical exam, annual  PE completed  Preventative labs ordered  Colonoscopy is up-to-date  Dentist-goes regularly  Ophthalmologist-goes regularly  Dermatologist-goes regularly  Vaccinations reviewed with patient    3. Essential hypertension  -     Comprehensive Metabolic Panel; Future  Stable, well-controlled.  Compliant on medication.    4. Mixed hyperlipidemia  On Repatha.  Managed by cardiology.    5. Left hip pain  Ongoing and episodic.  Managing with conservatively at this time.    6. Prostate cancer screening  -     PSA Screen; Future        Follow Up:   Return in about 6 months (around 2/8/2023) for Recheck, 6 month follow-up.     An After Visit Summary and PPPS were made available to the patient.          I spent 30 minutes caring for Omi on this date of service. This time includes time spent by me in the following activities:preparing for the visit, reviewing tests, obtaining and/or reviewing a separately obtained history, performing a medically appropriate examination and/or evaluation , counseling and educating the patient/family/caregiver, ordering medications, tests, or procedures, referring and communicating with other health care professionals , documenting information in the medical record, independently interpreting results and communicating that information with the patient/family/caregiver and care coordination

## 2022-10-06 ENCOUNTER — TELEPHONE (OUTPATIENT)
Dept: FAMILY MEDICINE CLINIC | Facility: CLINIC | Age: 72
End: 2022-10-06

## 2022-10-06 NOTE — TELEPHONE ENCOUNTER
Caller: Omi Owens    Relationship: Self    Best call back number: [phone]    What was the call regarding: PATIENT IS GOING OVERSEAS ON VACATION 10/20/22, PATIENT IS WANTING HIS 4TH BOOSTER AND TESTED POSITIVE FOR COVID-19 ON 09/20/22    Do you require a callback: YES

## 2022-10-06 NOTE — TELEPHONE ENCOUNTER
CDC says you can wait 3 months after covid symptoms/diagnosis to get the vaccine, but since [place], this puts him at greater risk. I would recommend getting booster one week before he leaves so any side effects should be over.

## 2022-10-06 NOTE — TELEPHONE ENCOUNTER
Contacted patient to inquire further, he reports he was recently sick with Covid on 9/20, can he received his covid booster by 10/20 before his vacation.

## 2022-11-16 DIAGNOSIS — M25.552 LEFT HIP PAIN: ICD-10-CM

## 2022-11-16 NOTE — TELEPHONE ENCOUNTER
Rx Refill Note  Requested Prescriptions     Pending Prescriptions Disp Refills   • diclofenac (VOLTAREN) 50 MG EC tablet [Pharmacy Med Name: DICLOFENAC SODIUM 50MG DR TABLETS] 60 tablet 5     Sig: TAKE 1 TABLET BY MOUTH TWICE DAILY WITH MEALS      Last office visit with prescribing clinician: 8/8/2022      Next office visit with prescribing clinician: 2/8/2023  Mrajan Hughes MA  11/16/22, 09:24 EST

## 2023-02-07 ENCOUNTER — PATIENT MESSAGE (OUTPATIENT)
Dept: FAMILY MEDICINE CLINIC | Facility: CLINIC | Age: 73
End: 2023-02-07
Payer: MEDICARE

## 2023-02-07 DIAGNOSIS — I10 ESSENTIAL HYPERTENSION: Primary | ICD-10-CM

## 2023-02-07 DIAGNOSIS — E78.2 MIXED HYPERLIPIDEMIA: ICD-10-CM

## 2023-02-08 ENCOUNTER — OFFICE VISIT (OUTPATIENT)
Dept: FAMILY MEDICINE CLINIC | Facility: CLINIC | Age: 73
End: 2023-02-08
Payer: MEDICARE

## 2023-02-08 ENCOUNTER — LAB (OUTPATIENT)
Dept: LAB | Facility: HOSPITAL | Age: 73
End: 2023-02-08
Payer: MEDICARE

## 2023-02-08 VITALS
HEIGHT: 71 IN | TEMPERATURE: 98.7 F | WEIGHT: 197 LBS | HEART RATE: 52 BPM | SYSTOLIC BLOOD PRESSURE: 126 MMHG | DIASTOLIC BLOOD PRESSURE: 80 MMHG | OXYGEN SATURATION: 99 % | BODY MASS INDEX: 27.58 KG/M2

## 2023-02-08 DIAGNOSIS — I10 ESSENTIAL HYPERTENSION: ICD-10-CM

## 2023-02-08 DIAGNOSIS — E78.2 MIXED HYPERLIPIDEMIA: ICD-10-CM

## 2023-02-08 DIAGNOSIS — I10 ESSENTIAL HYPERTENSION: Primary | ICD-10-CM

## 2023-02-08 DIAGNOSIS — M25.552 LEFT HIP PAIN: ICD-10-CM

## 2023-02-08 PROBLEM — M25.551 PAIN OF RIGHT HIP JOINT: Status: ACTIVE | Noted: 2021-12-10

## 2023-02-08 LAB
ALBUMIN SERPL-MCNC: 4.6 G/DL (ref 3.5–5.2)
ALBUMIN/GLOB SERPL: 2 G/DL
ALP SERPL-CCNC: 67 U/L (ref 39–117)
ALT SERPL-CCNC: 15 U/L (ref 1–41)
AST SERPL-CCNC: 19 U/L (ref 1–40)
BILIRUB SERPL-MCNC: 0.6 MG/DL (ref 0–1.2)
BUN SERPL-MCNC: 23 MG/DL (ref 8–23)
BUN/CREAT SERPL: 18.3 (ref 7–25)
CALCIUM SERPL-MCNC: 9.4 MG/DL (ref 8.6–10.5)
CHLORIDE SERPL-SCNC: 104 MMOL/L (ref 98–107)
CHOLEST SERPL-MCNC: 150 MG/DL (ref 0–200)
CO2 SERPL-SCNC: 29.5 MMOL/L (ref 22–29)
CREAT SERPL-MCNC: 1.26 MG/DL (ref 0.76–1.27)
EGFRCR SERPLBLD CKD-EPI 2021: 61 ML/MIN/1.73
GLOBULIN SER CALC-MCNC: 2.3 GM/DL
GLUCOSE SERPL-MCNC: 93 MG/DL (ref 65–99)
HDLC SERPL-MCNC: 73 MG/DL (ref 40–60)
LDLC SERPL CALC-MCNC: 62 MG/DL (ref 0–100)
POTASSIUM SERPL-SCNC: 4.3 MMOL/L (ref 3.5–5.2)
PROT SERPL-MCNC: 6.9 G/DL (ref 6–8.5)
SODIUM SERPL-SCNC: 142 MMOL/L (ref 136–145)
TRIGL SERPL-MCNC: 77 MG/DL (ref 0–150)
VLDLC SERPL CALC-MCNC: 15 MG/DL (ref 5–40)

## 2023-02-08 PROCEDURE — 99214 OFFICE O/P EST MOD 30 MIN: CPT | Performed by: PHYSICIAN ASSISTANT

## 2023-02-08 NOTE — PROGRESS NOTES
Chief Complaint   Patient presents with   • Hypertension     6 Month F/U   • Hyperlipidemia       Oim Owens is a pleasant 72 y.o. male who is here for routine follow-up of hypertension, osteoarthritic hip/back pain.  Patient is compliant on all medications.  Blood pressure is stable and well-controlled.  He has been running more often.  Exercising regularly at [facility].  He has ongoing episodic pain in his back/hip but this is manageable.  He is taking diclofenac 50 mg twice daily and is unsure if this is helping.  His pain is well-controlled currently.  He has upcoming appointment with Dr. Salas.  Overall doing well.    Past Medical History:   Diagnosis Date   • Colon polyp    • Gastroesophageal reflux disease 2021   • Hyperlipidemia    • Hypertension    • Loss of taste 2021   • Right hip pain 9/10/2021       Past Surgical History:   Procedure Laterality Date   • CORONARY STENT PLACEMENT     • EYE SURGERY     • HERNIA REPAIR         Family History   Problem Relation Age of Onset   • Heart attack Mother    • Heart attack Father    • Hyperlipidemia Father        Social History     Socioeconomic History   • Marital status:    Tobacco Use   • Smoking status: Former     Packs/day: 0.25     Years: 5.00     Pack years: 1.25     Types: Cigarettes     Start date: 1969     Quit date: 1974     Years since quittin.1     Passive exposure: Never   • Smokeless tobacco: Never   Vaping Use   • Vaping Use: Never used   Substance and Sexual Activity   • Alcohol use: Yes     Alcohol/week: 12.0 standard drinks     Types: 12 Drinks containing 0.5 oz of alcohol per week   • Drug use: No   • Sexual activity: Yes     Partners: Female     Birth control/protection: Vasectomy       Allergies   Allergen Reactions   • Statins Myalgia       ROS  Review of Systems   Constitutional: Negative for chills, fatigue and fever.   Respiratory: Negative for cough and wheezing.    Cardiovascular: Negative for chest  "pain, palpitations and leg swelling.   Musculoskeletal: Positive for arthralgias and back pain. Negative for gait problem.   Neurological: Negative for dizziness, weakness, numbness and headache.       Vitals:    02/08/23 0826   BP: 126/80   BP Location: Left arm   Patient Position: Sitting   Cuff Size: Adult   Pulse: 52   Temp: 98.7 °F (37.1 °C)   TempSrc: Temporal   SpO2: 99%   Weight: 89.4 kg (197 lb)   Height: 180.3 cm (70.98\")   PainSc: 0-No pain     Body mass index is 27.49 kg/m².    Current Outpatient Medications on File Prior to Visit   Medication Sig Dispense Refill   • aspirin 81 MG EC tablet aspirin 81 mg tablet,delayed release   Take 1 tablet every day by oral route.     • diclofenac (VOLTAREN) 50 MG EC tablet TAKE 1 TABLET BY MOUTH TWICE DAILY WITH MEALS 60 tablet 2   • Loratadine 10 MG capsule Take 1 tablet by mouth Daily.     • REPATHA PUSHTRONEX SYSTEM 420 MG/3.5ML solution cartridge      • valACYclovir (VALTREX) 1000 MG tablet Take 2 tablets by mouth immediately then take 2 tablets 12 hours later, continue 1 tablet twice daily until cold sore resolves 30 tablet 0   • valsartan-hydrochlorothiazide (DIOVAN-HCT) 160-12.5 MG per tablet TAKE 1/2 TABLET BY MOUTH DAILY 45 tablet 1   • [DISCONTINUED] diclofenac (VOLTAREN) 50 MG EC tablet        No current facility-administered medications on file prior to visit.       Results for orders placed or performed in visit on 08/08/22   PSA Screen    Specimen: Blood    Blood  Release to reva   Result Value Ref Range    PSA 1.970 0.000 - 4.000 ng/mL   Comprehensive Metabolic Panel    Specimen: Blood    Blood  Release to reva   Result Value Ref Range    Glucose 94 65 - 99 mg/dL    BUN 23 8 - 23 mg/dL    Creatinine 1.01 0.76 - 1.27 mg/dL    EGFR Result 79.5 >60.0 mL/min/1.73    BUN/Creatinine Ratio 22.8 7.0 - 25.0    Sodium 142 136 - 145 mmol/L    Potassium 4.3 3.5 - 5.2 mmol/L    Chloride 101 98 - 107 mmol/L    Total CO2 28.0 22.0 - 29.0 mmol/L    Calcium 9.4 8.6 - " 10.5 mg/dL    Total Protein 6.6 6.0 - 8.5 g/dL    Albumin 4.60 3.50 - 5.20 g/dL    Globulin 2.0 gm/dL    A/G Ratio 2.3 g/dL    Total Bilirubin 0.3 0.0 - 1.2 mg/dL    Alkaline Phosphatase 87 39 - 117 U/L    AST (SGOT) 18 1 - 40 U/L    ALT (SGPT) 16 1 - 41 U/L       PE    Physical Exam  Vitals reviewed.   Constitutional:       General: He is not in acute distress.     Appearance: Normal appearance. He is well-developed and normal weight. He is not ill-appearing or diaphoretic.   HENT:      Head: Normocephalic and atraumatic.   Eyes:      Extraocular Movements: Extraocular movements intact.      Conjunctiva/sclera: Conjunctivae normal.   Cardiovascular:      Rate and Rhythm: Normal rate and regular rhythm.      Heart sounds: Normal heart sounds.   Pulmonary:      Effort: Pulmonary effort is normal.      Breath sounds: Normal breath sounds.   Musculoskeletal:         General: Normal range of motion.      Cervical back: Normal range of motion.      Right lower leg: No edema.      Left lower leg: No edema.   Skin:     General: Skin is warm.      Findings: No erythema or rash.   Neurological:      General: No focal deficit present.      Mental Status: He is alert.   Psychiatric:         Attention and Perception: Attention and perception normal. He is attentive.         Mood and Affect: Mood and affect normal.         Speech: Speech normal.         Behavior: Behavior normal. Behavior is cooperative.         Thought Content: Thought content normal.         Cognition and Memory: Cognition and memory normal.         Judgment: Judgment normal.         A/P    Diagnoses and all orders for this visit:    1. Essential hypertension (Primary)  Stable, well-controlled.  Compliant on medication.    2. Mixed hyperlipidemia  On Repatha - prescribed by cardiology.  Lipid panel pending.    3. Left hip pain  Episodic pain.  Managing well currently.  On diclofenac 50 mg twice daily.  Has upcoming appointment with Dr. Salas.       Plan  of care reviewed with patient at the conclusion of today's visit. Education was provided regarding diagnosis, management and any prescribed or recommended OTC medications.  Patient verbalizes understanding of and agreement with management plan.    Dictated Utilizing Dragon Dictation     Please note that portions of this note were completed with a voice recognition program.     Part of this note may be an electronic transcription/translation of spoken language to printed text using the Dragon Dictation System.    Return in about 6 months (around 8/10/2023) for Medicare Wellness.     Sally Mann PA-C  Answers for HPI/ROS submitted by the patient on 2/1/2023  What is the primary reason for your visit?: Physical

## 2023-02-09 DIAGNOSIS — M25.552 LEFT HIP PAIN: ICD-10-CM

## 2023-02-09 NOTE — TELEPHONE ENCOUNTER
Rx Refill Note  Requested Prescriptions     Pending Prescriptions Disp Refills   • diclofenac (VOLTAREN) 50 MG EC tablet [Pharmacy Med Name: DICLOFENAC SODIUM 50MG DR TABLETS] 60 tablet 2     Sig: TAKE 1 TABLET BY MOUTH TWICE DAILY WITH MEALS      Last office visit with prescribing clinician: 2/8/2023   Last telemedicine visit with prescribing clinician: Visit date not found   Next office visit with prescribing clinician: 8/9/2023                         Would you like a call back once the refill request has been completed: [] Yes [] No    If the office needs to give you a call back, can they leave a voicemail: [] Yes [] No    Suellen Deluca MA  02/09/23, 08:11 EST

## 2023-03-11 DIAGNOSIS — I10 ESSENTIAL HYPERTENSION: ICD-10-CM

## 2023-03-11 RX ORDER — VALSARTAN AND HYDROCHLOROTHIAZIDE 160; 12.5 MG/1; MG/1
TABLET, FILM COATED ORAL
Qty: 45 TABLET | Refills: 1 | Status: SHIPPED | OUTPATIENT
Start: 2023-03-11

## 2023-03-11 NOTE — TELEPHONE ENCOUNTER
Rx Refill Note  Requested Prescriptions     Pending Prescriptions Disp Refills   • valsartan-hydrochlorothiazide (DIOVAN-HCT) 160-12.5 MG per tablet [Pharmacy Med Name: VALSARTAN/HCTZ 160MG/12.5MG TABLETS] 45 tablet 1     Sig: TAKE 1/2 TABLET BY MOUTH DAILY      Last office visit with prescribing clinician: 2/8/2023   Last telemedicine visit with prescribing clinician: 8/9/2023   Next office visit with prescribing clinician: 8/9/2023                         Would you like a call back once the refill request has been completed: [] Yes [] No    If the office needs to give you a call back, can they leave a voicemail: [] Yes [] No    Maggi Campbell MA  03/11/23, 09:03 EST

## 2023-05-10 DIAGNOSIS — M25.552 LEFT HIP PAIN: ICD-10-CM

## 2023-05-10 NOTE — TELEPHONE ENCOUNTER
Rx Refill Note  Requested Prescriptions     Pending Prescriptions Disp Refills   • diclofenac (VOLTAREN) 50 MG EC tablet [Pharmacy Med Name: DICLOFENAC SODIUM 50MG DR TABLETS] 60 tablet 2     Sig: TAKE 1 TABLET BY MOUTH TWICE DAILY WITH MEALS      Last office visit with prescribing clinician: 2/8/2023   Last telemedicine visit with prescribing clinician: 3/11/2023   Next office visit with prescribing clinician: 8/9/2023                         Would you like a call back once the refill request has been completed: [] Yes [] No    If the office needs to give you a call back, can they leave a voicemail: [] Yes [] No    Maggi Campbell MA  05/10/23, 09:57 EDT

## 2023-08-08 DIAGNOSIS — M25.552 LEFT HIP PAIN: ICD-10-CM

## 2023-08-21 ENCOUNTER — OFFICE VISIT (OUTPATIENT)
Dept: FAMILY MEDICINE CLINIC | Facility: CLINIC | Age: 73
End: 2023-08-21
Payer: MEDICARE

## 2023-08-21 VITALS
WEIGHT: 195.8 LBS | BODY MASS INDEX: 27.41 KG/M2 | SYSTOLIC BLOOD PRESSURE: 130 MMHG | DIASTOLIC BLOOD PRESSURE: 78 MMHG | HEIGHT: 71 IN

## 2023-08-21 DIAGNOSIS — Z00.00 MEDICARE ANNUAL WELLNESS VISIT, SUBSEQUENT: Primary | ICD-10-CM

## 2023-08-21 DIAGNOSIS — Z00.00 PHYSICAL EXAM, ANNUAL: ICD-10-CM

## 2023-08-21 DIAGNOSIS — B00.1 RECURRENT COLD SORES: ICD-10-CM

## 2023-08-21 DIAGNOSIS — R00.1 BRADYCARDIA: ICD-10-CM

## 2023-08-21 DIAGNOSIS — E78.2 MIXED HYPERLIPIDEMIA: ICD-10-CM

## 2023-08-21 DIAGNOSIS — I10 ESSENTIAL HYPERTENSION: ICD-10-CM

## 2023-08-21 DIAGNOSIS — J34.89 NASAL DRAINAGE: ICD-10-CM

## 2023-08-21 DIAGNOSIS — Z12.5 PROSTATE CANCER SCREENING: ICD-10-CM

## 2023-08-21 PROCEDURE — 1159F MED LIST DOCD IN RCRD: CPT | Performed by: PHYSICIAN ASSISTANT

## 2023-08-21 PROCEDURE — 3075F SYST BP GE 130 - 139MM HG: CPT | Performed by: PHYSICIAN ASSISTANT

## 2023-08-21 PROCEDURE — 96160 PT-FOCUSED HLTH RISK ASSMT: CPT | Performed by: PHYSICIAN ASSISTANT

## 2023-08-21 PROCEDURE — 3078F DIAST BP <80 MM HG: CPT | Performed by: PHYSICIAN ASSISTANT

## 2023-08-21 PROCEDURE — 1160F RVW MEDS BY RX/DR IN RCRD: CPT | Performed by: PHYSICIAN ASSISTANT

## 2023-08-21 PROCEDURE — 99397 PER PM REEVAL EST PAT 65+ YR: CPT | Performed by: PHYSICIAN ASSISTANT

## 2023-08-21 PROCEDURE — G0439 PPPS, SUBSEQ VISIT: HCPCS | Performed by: PHYSICIAN ASSISTANT

## 2023-08-21 RX ORDER — IPRATROPIUM BROMIDE 42 UG/1
2 SPRAY, METERED NASAL 4 TIMES DAILY
Qty: 15 ML | Refills: 5 | Status: SHIPPED | OUTPATIENT
Start: 2023-08-21

## 2023-08-21 RX ORDER — ACYCLOVIR 50 MG/G
OINTMENT TOPICAL
Qty: 30 G | Refills: 0 | Status: SHIPPED | OUTPATIENT
Start: 2023-08-21

## 2023-08-21 NOTE — PROGRESS NOTES
The ABCs of the Annual Wellness Visit  Subsequent Medicare Wellness Visit    Subjective    Omi Owens is a 73 y.o. male who presents for a Subsequent Medicare Wellness Visit.    The following portions of the patient's history were reviewed and   updated as appropriate: allergies, current medications, past family history, past medical history, past social history, past surgical history, and problem list.    Compared to one year ago, the patient feels his physical   health is better.    Compared to one year ago, the patient feels his mental   health is the same.    Recent Hospitalizations:  He was not admitted to the hospital during the last year.       Current Medical Providers:  Patient Care Team:  Sally Mann PA-C as PCP - General (Physician Assistant)  Pedro Ellis MD as Consulting Physician (Cardiology)  Papo Salas MD as Consulting Physician (Orthopedic Surgery)    Outpatient Medications Prior to Visit   Medication Sig Dispense Refill    aspirin 81 MG EC tablet aspirin 81 mg tablet,delayed release   Take 1 tablet every day by oral route.      diclofenac (VOLTAREN) 50 MG EC tablet TAKE 1 TABLET BY MOUTH TWICE DAILY WITH MEALS 60 tablet 2    Loratadine 10 MG capsule Take 1 capsule by mouth Daily.      REPATHA PUSHTRONEX SYSTEM 420 MG/3.5ML solution cartridge       valACYclovir (VALTREX) 1000 MG tablet Take 2 tablets by mouth immediately then take 2 tablets 12 hours later, continue 1 tablet twice daily until cold sore resolves 30 tablet 0    valsartan-hydrochlorothiazide (DIOVAN-HCT) 160-12.5 MG per tablet TAKE 1/2 TABLET BY MOUTH DAILY 45 tablet 1     No facility-administered medications prior to visit.       No opioid medication identified on active medication list. I have reviewed chart for other potential  high risk medication/s and harmful drug interactions in the elderly.        Aspirin is on active medication list. Aspirin use is indicated based on review of current  "medical condition/s. Pros and cons of this therapy have been discussed today. Benefits of this medication outweigh potential harm.  Patient has been encouraged to continue taking this medication.  .      Patient Active Problem List   Diagnosis    Essential hypertension    Seasonal allergies    Mixed hyperlipidemia    Vitamin D deficiency    Bradycardia    Recurrent cold sores    Pain of right hip joint    Pain of left hip joint     Advance Care Planning   Advance Care Planning     Advance Directive is not on file.  ACP discussion was held with the patient during this visit. Patient does not have an advance directive, declines further assistance.     Objective    Vitals:    23 1123   BP: 130/78   Weight: 88.8 kg (195 lb 12.8 oz)   Height: 180.3 cm (70.98\")     Estimated body mass index is 27.32 kg/mý as calculated from the following:    Height as of this encounter: 180.3 cm (70.98\").    Weight as of this encounter: 88.8 kg (195 lb 12.8 oz).    BMI is >= 25 and <30. (Overweight) The following options were offered after discussion;: exercise counseling/recommendations and nutrition counseling/recommendations      Does the patient have evidence of cognitive impairment? No          HEALTH RISK ASSESSMENT    Smoking Status:  Social History     Tobacco Use   Smoking Status Former    Packs/day: 0.25    Years: 5.00    Pack years: 1.25    Types: Cigarettes    Start date: 1969    Quit date: 1974    Years since quittin.6    Passive exposure: Never   Smokeless Tobacco Never     Alcohol Consumption:  Social History     Substance and Sexual Activity   Alcohol Use Yes    Alcohol/week: 12.0 standard drinks    Types: 12 Drinks containing 0.5 oz of alcohol per week     Fall Risk Screen:    STEADI Fall Risk Assessment was completed, and patient is at LOW risk for falls.Assessment completed on:2023    Depression Screenin/8/2023     8:30 AM   PHQ-2/PHQ-9 Depression Screening   Little Interest or Pleasure " in Doing Things 0-->not at all   Feeling Down, Depressed or Hopeless 0-->not at all   PHQ-9: Brief Depression Severity Measure Score 0       Health Habits and Functional and Cognitive Screenin/8/2022     8:09 AM   Functional & Cognitive Status   Do you have difficulty preparing food and eating? No   Do you have difficulty bathing yourself, getting dressed or grooming yourself? No   Do you have difficulty using the toilet? No   Do you have difficulty moving around from place to place? No   Do you have trouble with steps or getting out of a bed or a chair? No   Current Diet Well Balanced Diet   Dental Exam Up to date   Eye Exam Up to date   Exercise (times per week) 5 times per week   Current Exercises Include Walking;Swimming   Do you need help using the phone?  No   Are you deaf or do you have serious difficulty hearing?  No   Do you need help to go to places out of walking distance? No   Do you need help shopping? No   Do you need help preparing meals?  No   Do you need help with housework?  No   Do you need help with laundry? No   Do you need help taking your medications? No   Do you need help managing money? No   Do you ever drive or ride in a car without wearing a seat belt? No       Age-appropriate Screening Schedule:  Refer to the list below for future screening recommendations based on patient's age, sex and/or medical conditions. Orders for these recommended tests are listed in the plan section. The patient has been provided with a written plan.    Health Maintenance   Topic Date Due    TDAP/TD VACCINES (2 - Td or Tdap) 2022    COVID-19 Vaccine (6 - Pfizer series) 2023    INFLUENZA VACCINE  10/01/2023    LIPID PANEL  2024    ANNUAL WELLNESS VISIT  2024    COLORECTAL CANCER SCREENING  2026    HEPATITIS C SCREENING  Completed    Pneumococcal Vaccine 65+  Completed    AAA SCREEN (ONE-TIME)  Completed    ZOSTER VACCINE  Completed                  CMS Preventative Services  "Quick Reference  Risk Factors Identified During Encounter  Immunizations Discussed/Encouraged: Td and COVID19  Dental Screening Recommended  Vision Screening Recommended  The above risks/problems have been discussed with the patient.  Pertinent information has been shared with the patient in the After Visit Summary.  An After Visit Summary and PPPS were made available to the patient.    Follow Up:   Next Medicare Wellness visit to be scheduled in 1 year.       Additional E&M Note during same encounter follows:  Patient has multiple medical problems which are significant and separately identifiable that require additional work above and beyond the Medicare Wellness Visit.      Chief Complaint  Medicare Wellness-subsequent    Subjective        HPI  Omi Owens is also being seen today for Annual Physical Exam.    Review of Systems   Constitutional:  Negative for chills and fever.   HENT:  Negative for congestion, ear pain, postnasal drip, rhinorrhea, sinus pressure and sore throat.    Eyes:  Negative for visual disturbance.   Respiratory:  Negative for cough, shortness of breath and wheezing.    Cardiovascular:  Negative for chest pain, palpitations and leg swelling.   Gastrointestinal:  Negative for abdominal pain, blood in stool, diarrhea, nausea and vomiting.   Endocrine: Negative for polyuria.   Genitourinary:  Negative for dysuria, flank pain, frequency and hematuria.   Musculoskeletal:  Negative for arthralgias, gait problem and myalgias.   Skin:  Negative for rash.   Neurological:  Negative for dizziness and confusion.   Hematological:  Does not bruise/bleed easily.   Psychiatric/Behavioral:  Negative for agitation, dysphoric mood, self-injury, sleep disturbance and suicidal ideas. The patient is not nervous/anxious.      Objective   Vital Signs:  /78   Ht 180.3 cm (70.98\")   Wt 88.8 kg (195 lb 12.8 oz)   BMI 27.32 kg/mý     Physical Exam  Vitals reviewed.   Constitutional:       General: He is not " in acute distress.     Appearance: Normal appearance. He is well-developed and normal weight. He is not ill-appearing or diaphoretic.   HENT:      Head: Normocephalic and atraumatic.      Right Ear: Hearing, tympanic membrane, ear canal and external ear normal.      Left Ear: Hearing, tympanic membrane, ear canal and external ear normal.      Nose: Nose normal.      Right Sinus: No maxillary sinus tenderness or frontal sinus tenderness.      Left Sinus: No maxillary sinus tenderness or frontal sinus tenderness.      Mouth/Throat:      Pharynx: Uvula midline.   Eyes:      General: Lids are normal.      Extraocular Movements: Extraocular movements intact.      Conjunctiva/sclera: Conjunctivae normal.   Neck:      Thyroid: No thyroid mass or thyromegaly.      Trachea: Trachea and phonation normal.   Cardiovascular:      Rate and Rhythm: Normal rate and regular rhythm.      Heart sounds: Normal heart sounds.   Pulmonary:      Effort: Pulmonary effort is normal.      Breath sounds: Normal breath sounds.   Abdominal:      General: Bowel sounds are normal. There is no distension.      Palpations: Abdomen is soft. Abdomen is not rigid.      Tenderness: There is no abdominal tenderness. There is no guarding.   Musculoskeletal:         General: Normal range of motion.      Cervical back: Normal range of motion.      Right lower leg: No edema.      Left lower leg: No edema.   Lymphadenopathy:      Cervical: No cervical adenopathy.      Right cervical: No superficial cervical adenopathy.     Left cervical: No superficial cervical adenopathy.   Skin:     General: Skin is warm.      Findings: No erythema or rash.      Nails: There is no clubbing.   Neurological:      Mental Status: He is alert and oriented to person, place, and time.      Coordination: Coordination normal.      Gait: Gait normal.      Deep Tendon Reflexes: Reflexes are normal and symmetric.      Comments: CN grossly intact   Psychiatric:         Attention and  Perception: Attention and perception normal. He is attentive.         Mood and Affect: Mood and affect normal.         Speech: Speech normal.         Behavior: Behavior normal. Behavior is cooperative.         Thought Content: Thought content normal.         Cognition and Memory: Cognition and memory normal.         Judgment: Judgment normal.           Assessment and Plan   Diagnoses and all orders for this visit:    1. Medicare annual wellness visit, subsequent (Primary)    2. Physical exam, annual  -     CBC Auto Differential; Future  -     Comprehensive Metabolic Panel; Future  -     TSH Rfx On Abnormal To Free T4; Future  -     Lipid Panel; Future  -     PSA Screen; Future  PE completed  Preventative labs ordered  Colonoscopy is up-to-date  Dentist - going regularly  Ophthalmologist - going regularly  Dermatologist - going regularly  Vaccinations discussed    3. Mixed hyperlipidemia  -     Comprehensive Metabolic Panel; Future  -     Lipid Panel; Future    4. Essential hypertension  -     CBC Auto Differential; Future  -     Comprehensive Metabolic Panel; Future  Stable, well-controlled.  Compliant on medication.    5. Bradycardia  -     TSH Rfx On Abnormal To Free T4; Future  Asymptomatic.  Followed by Dr. Ellis.    6. Recurrent cold sores  -     acyclovir (Zovirax) 5 % ointment; Apply topical ointment to cold sore.  Dispense: 30 g; Refill: 0    7. Nasal drainage  -     ipratropium (ATROVENT) 0.06 % nasal spray; 2 sprays into the nostril(s) as directed by provider 4 (Four) Times a Day.  Dispense: 15 mL; Refill: 5    8. Prostate cancer screening  -     PSA Screen; Future           I spent 40 minutes caring for Omi on this date of service. This time includes time spent by me in the following activities:preparing for the visit, reviewing tests, obtaining and/or reviewing a separately obtained history, performing a medically appropriate examination and/or evaluation , counseling and educating the  patient/family/caregiver, ordering medications, tests, or procedures, referring and communicating with other health care professionals , documenting information in the medical record, independently interpreting results and communicating that information with the patient/family/caregiver, and care coordination  Follow Up   Return in about 6 months (around 2/21/2024) for Recheck, 6 month follow-up.  Patient was given instructions and counseling regarding his condition or for health maintenance advice. Please see specific information pulled into the AVS if appropriate.         Answers submitted by the patient for this visit:  Primary Reason for Visit (Submitted on 8/21/2023)  What is the primary reason for your visit?: Other  Other (Submitted on 8/21/2023)  Please describe your symptoms.: Checkup  Have you had these symptoms before?: No  How long have you been having these symptoms?: 1-4 days  Please list any medications you are currently taking for this condition.: None

## 2023-08-22 ENCOUNTER — LAB (OUTPATIENT)
Dept: LAB | Facility: HOSPITAL | Age: 73
End: 2023-08-22
Payer: MEDICARE

## 2023-08-22 DIAGNOSIS — Z00.00 PHYSICAL EXAM, ANNUAL: ICD-10-CM

## 2023-08-22 DIAGNOSIS — E78.2 MIXED HYPERLIPIDEMIA: ICD-10-CM

## 2023-08-22 DIAGNOSIS — Z12.5 PROSTATE CANCER SCREENING: ICD-10-CM

## 2023-08-22 DIAGNOSIS — R00.1 BRADYCARDIA: ICD-10-CM

## 2023-08-22 DIAGNOSIS — I10 ESSENTIAL HYPERTENSION: ICD-10-CM

## 2023-08-22 LAB
ALBUMIN SERPL-MCNC: 4.3 G/DL (ref 3.5–5.2)
ALBUMIN/GLOB SERPL: 2 G/DL
ALP SERPL-CCNC: 68 U/L (ref 39–117)
ALT SERPL-CCNC: 14 U/L (ref 1–41)
AST SERPL-CCNC: 14 U/L (ref 1–40)
BASOPHILS # BLD AUTO: 0.07 10*3/MM3 (ref 0–0.2)
BASOPHILS NFR BLD AUTO: 1.1 % (ref 0–1.5)
BILIRUB SERPL-MCNC: 0.7 MG/DL (ref 0–1.2)
BUN SERPL-MCNC: 25 MG/DL (ref 8–23)
BUN/CREAT SERPL: 18.2 (ref 7–25)
CALCIUM SERPL-MCNC: 9.5 MG/DL (ref 8.6–10.5)
CHLORIDE SERPL-SCNC: 105 MMOL/L (ref 98–107)
CHOLEST SERPL-MCNC: 138 MG/DL (ref 0–200)
CO2 SERPL-SCNC: 26.6 MMOL/L (ref 22–29)
CREAT SERPL-MCNC: 1.37 MG/DL (ref 0.76–1.27)
EGFRCR SERPLBLD CKD-EPI 2021: 54.8 ML/MIN/1.73
EOSINOPHIL # BLD AUTO: 0.11 10*3/MM3 (ref 0–0.4)
EOSINOPHIL NFR BLD AUTO: 1.8 % (ref 0.3–6.2)
ERYTHROCYTE [DISTWIDTH] IN BLOOD BY AUTOMATED COUNT: 13 % (ref 12.3–15.4)
GLOBULIN SER CALC-MCNC: 2.2 GM/DL
GLUCOSE SERPL-MCNC: 96 MG/DL (ref 65–99)
HCT VFR BLD AUTO: 46.4 % (ref 37.5–51)
HDLC SERPL-MCNC: 67 MG/DL (ref 40–60)
HGB BLD-MCNC: 16.5 G/DL (ref 13–17.7)
IMM GRANULOCYTES # BLD AUTO: 0.02 10*3/MM3 (ref 0–0.05)
IMM GRANULOCYTES NFR BLD AUTO: 0.3 % (ref 0–0.5)
LDLC SERPL CALC-MCNC: 55 MG/DL (ref 0–100)
LYMPHOCYTES # BLD AUTO: 1.06 10*3/MM3 (ref 0.7–3.1)
LYMPHOCYTES NFR BLD AUTO: 17 % (ref 19.6–45.3)
MCH RBC QN AUTO: 33.5 PG (ref 26.6–33)
MCHC RBC AUTO-ENTMCNC: 35.6 G/DL (ref 31.5–35.7)
MCV RBC AUTO: 94.3 FL (ref 79–97)
MONOCYTES # BLD AUTO: 0.38 10*3/MM3 (ref 0.1–0.9)
MONOCYTES NFR BLD AUTO: 6.1 % (ref 5–12)
NEUTROPHILS # BLD AUTO: 4.58 10*3/MM3 (ref 1.7–7)
NEUTROPHILS NFR BLD AUTO: 73.7 % (ref 42.7–76)
NRBC BLD AUTO-RTO: 0 /100 WBC (ref 0–0.2)
PLATELET # BLD AUTO: 162 10*3/MM3 (ref 140–450)
POTASSIUM SERPL-SCNC: 5.2 MMOL/L (ref 3.5–5.2)
PROT SERPL-MCNC: 6.5 G/DL (ref 6–8.5)
PSA SERPL-MCNC: 1.56 NG/ML (ref 0–4)
RBC # BLD AUTO: 4.92 10*6/MM3 (ref 4.14–5.8)
SODIUM SERPL-SCNC: 142 MMOL/L (ref 136–145)
TRIGL SERPL-MCNC: 85 MG/DL (ref 0–150)
TSH SERPL DL<=0.005 MIU/L-ACNC: 2.3 UIU/ML (ref 0.27–4.2)
VLDLC SERPL CALC-MCNC: 16 MG/DL (ref 5–40)
WBC # BLD AUTO: 6.22 10*3/MM3 (ref 3.4–10.8)

## 2023-08-23 ENCOUNTER — PRIOR AUTHORIZATION (OUTPATIENT)
Dept: FAMILY MEDICINE CLINIC | Facility: CLINIC | Age: 73
End: 2023-08-23
Payer: MEDICARE

## 2023-08-23 NOTE — TELEPHONE ENCOUNTER
He can use GoodRx and pay without insurance or try Abreva over-the-counter medication.   Is This A New Presentation, Or A Follow-Up?: Skin Lesions

## 2023-08-24 ENCOUNTER — PRIOR AUTHORIZATION (OUTPATIENT)
Dept: FAMILY MEDICINE CLINIC | Facility: CLINIC | Age: 73
End: 2023-08-24
Payer: MEDICARE

## 2023-08-24 NOTE — TELEPHONE ENCOUNTER
[id number]      PA submitted for acyclovir 5% ointment   Awaiting response     We cover this drug when our criteria are met. The unmet criteria are: patient has a diagnosis of non-life-threatening mucocutaneous herpes simplex virus (HSV) infection while having a weakened immune system (immunocompromised) AND has tried or cannot use one of the following: oral acyclovir, or famciclovir. This decision was from [facility] Top[facility] Antivir[facility] Pharmacy Coverage Policy.

## 2023-08-25 ENCOUNTER — TELEPHONE (OUTPATIENT)
Dept: FAMILY MEDICINE CLINIC | Facility: CLINIC | Age: 73
End: 2023-08-25
Payer: MEDICARE

## 2023-08-25 NOTE — TELEPHONE ENCOUNTER
Doris from [facility] called with some follow up questions regarding the PA for the pt's Acyclovir. Her call back number is [phone] and the reference number for the call is [id number].

## 2023-09-07 DIAGNOSIS — I10 ESSENTIAL HYPERTENSION: ICD-10-CM

## 2023-09-07 RX ORDER — VALSARTAN AND HYDROCHLOROTHIAZIDE 160; 12.5 MG/1; MG/1
TABLET, FILM COATED ORAL
Qty: 45 TABLET | Refills: 1 | Status: SHIPPED | OUTPATIENT
Start: 2023-09-07

## 2023-11-06 DIAGNOSIS — M25.552 LEFT HIP PAIN: ICD-10-CM

## 2024-02-14 ENCOUNTER — TELEPHONE (OUTPATIENT)
Dept: FAMILY MEDICINE CLINIC | Facility: CLINIC | Age: 74
End: 2024-02-14
Payer: MEDICARE

## 2024-02-14 DIAGNOSIS — I10 ESSENTIAL HYPERTENSION: Primary | ICD-10-CM

## 2024-02-15 ENCOUNTER — LAB (OUTPATIENT)
Dept: LAB | Facility: HOSPITAL | Age: 74
End: 2024-02-15
Payer: MEDICARE

## 2024-02-15 DIAGNOSIS — I10 ESSENTIAL HYPERTENSION: ICD-10-CM

## 2024-02-15 LAB
ALBUMIN SERPL-MCNC: 4.4 G/DL (ref 3.5–5.2)
ALBUMIN/GLOB SERPL: 2.1 G/DL
ALP SERPL-CCNC: 75 U/L (ref 39–117)
ALT SERPL-CCNC: 14 U/L (ref 1–41)
AST SERPL-CCNC: 14 U/L (ref 1–40)
BILIRUB SERPL-MCNC: 0.5 MG/DL (ref 0–1.2)
BUN SERPL-MCNC: 27 MG/DL (ref 8–23)
BUN/CREAT SERPL: 23.5 (ref 7–25)
CALCIUM SERPL-MCNC: 9.5 MG/DL (ref 8.6–10.5)
CHLORIDE SERPL-SCNC: 103 MMOL/L (ref 98–107)
CO2 SERPL-SCNC: 27.1 MMOL/L (ref 22–29)
CREAT SERPL-MCNC: 1.15 MG/DL (ref 0.76–1.27)
EGFRCR SERPLBLD CKD-EPI 2021: 67.6 ML/MIN/1.73
GLOBULIN SER CALC-MCNC: 2.1 GM/DL
GLUCOSE SERPL-MCNC: 105 MG/DL (ref 65–99)
POTASSIUM SERPL-SCNC: 5.1 MMOL/L (ref 3.5–5.2)
PROT SERPL-MCNC: 6.5 G/DL (ref 6–8.5)
SODIUM SERPL-SCNC: 141 MMOL/L (ref 136–145)

## 2024-02-19 ENCOUNTER — OFFICE VISIT (OUTPATIENT)
Dept: FAMILY MEDICINE CLINIC | Facility: CLINIC | Age: 74
End: 2024-02-19
Payer: MEDICARE

## 2024-02-19 VITALS
DIASTOLIC BLOOD PRESSURE: 78 MMHG | SYSTOLIC BLOOD PRESSURE: 122 MMHG | HEIGHT: 71 IN | BODY MASS INDEX: 27.64 KG/M2 | WEIGHT: 197.4 LBS

## 2024-02-19 DIAGNOSIS — I10 ESSENTIAL HYPERTENSION: Primary | ICD-10-CM

## 2024-02-19 DIAGNOSIS — M25.552 LEFT HIP PAIN: ICD-10-CM

## 2024-02-19 PROCEDURE — 99213 OFFICE O/P EST LOW 20 MIN: CPT | Performed by: PHYSICIAN ASSISTANT

## 2024-02-19 PROCEDURE — 1160F RVW MEDS BY RX/DR IN RCRD: CPT | Performed by: PHYSICIAN ASSISTANT

## 2024-02-19 PROCEDURE — 3078F DIAST BP <80 MM HG: CPT | Performed by: PHYSICIAN ASSISTANT

## 2024-02-19 PROCEDURE — 3074F SYST BP LT 130 MM HG: CPT | Performed by: PHYSICIAN ASSISTANT

## 2024-02-19 PROCEDURE — 1159F MED LIST DOCD IN RCRD: CPT | Performed by: PHYSICIAN ASSISTANT

## 2024-02-19 RX ORDER — VALSARTAN AND HYDROCHLOROTHIAZIDE 160; 12.5 MG/1; MG/1
0.5 TABLET, FILM COATED ORAL DAILY
Qty: 45 TABLET | Refills: 1 | Status: SHIPPED | OUTPATIENT
Start: 2024-02-19

## 2024-02-19 NOTE — PROGRESS NOTES
"Chief Complaint   Patient presents with    Hypertension       Omi Owens is a pleasant 73 y.o. male who is here for routine follow-up of hypertension.  Patient is compliant on all medications.  He denies any issues with hypertension symptoms.  Not monitoring BP regularly at home.  BP today in office is normal.    Past Medical History:   Diagnosis Date    Colon polyp     Gastroesophageal reflux disease 2021    Hyperlipidemia     Hypertension     Loss of taste 2021    Right hip pain 9/10/2021       Past Surgical History:   Procedure Laterality Date    CORONARY STENT PLACEMENT  2015    EYE SURGERY      HERNIA REPAIR         Family History   Problem Relation Age of Onset    Heart attack Mother     Heart attack Father     Hyperlipidemia Father        Social History     Socioeconomic History    Marital status:    Tobacco Use    Smoking status: Former     Packs/day: 0.25     Years: 5.00     Additional pack years: 0.00     Total pack years: 1.25     Types: Cigarettes     Start date: 1969     Quit date: 1974     Years since quittin.1     Passive exposure: Never    Smokeless tobacco: Never   Vaping Use    Vaping Use: Never used   Substance and Sexual Activity    Alcohol use: Yes     Alcohol/week: 12.0 standard drinks of alcohol     Types: 12 Drinks containing 0.5 oz of alcohol per week    Drug use: No    Sexual activity: Yes     Partners: Female     Birth control/protection: Vasectomy       Allergies   Allergen Reactions    Statins Myalgia       ROS  Review of Systems   Constitutional:  Negative for chills and fever.   Respiratory:  Negative for cough, shortness of breath and wheezing.    Cardiovascular:  Negative for chest pain, palpitations and leg swelling.   Neurological:  Negative for dizziness and headache.       Vitals:    24 0936   BP: 122/78   BP Location: Left arm   Patient Position: Sitting   Cuff Size: Adult   Weight: 89.5 kg (197 lb 6.4 oz)   Height: 180.3 cm (70.98\") "     Body mass index is 27.55 kg/m².           Current Outpatient Medications on File Prior to Visit   Medication Sig Dispense Refill    acyclovir (Zovirax) 5 % ointment Apply topical ointment to cold sore. 30 g 0    aspirin 81 MG EC tablet aspirin 81 mg tablet,delayed release   Take 1 tablet every day by oral route.      ipratropium (ATROVENT) 0.06 % nasal spray 2 sprays into the nostril(s) as directed by provider 4 (Four) Times a Day. 15 mL 5    Loratadine 10 MG capsule Take 1 capsule by mouth Daily.      REPATHA PUSHTRONEX SYSTEM 420 MG/3.5ML solution cartridge       valACYclovir (VALTREX) 1000 MG tablet Take 2 tablets by mouth immediately then take 2 tablets 12 hours later, continue 1 tablet twice daily until cold sore resolves 30 tablet 0    [DISCONTINUED] diclofenac (VOLTAREN) 50 MG EC tablet TAKE 1 TABLET BY MOUTH TWICE DAILY WITH MEALS 60 tablet 2    [DISCONTINUED] valsartan-hydrochlorothiazide (DIOVAN-HCT) 160-12.5 MG per tablet TAKE 1/2 TABLET BY MOUTH DAILY 45 tablet 1     No current facility-administered medications on file prior to visit.       Results for orders placed or performed in visit on 02/15/24   Comprehensive Metabolic Panel    Specimen: Blood    Blood  Release to [facility]   Result Value Ref Range    Glucose 105 (H) 65 - 99 mg/dL    BUN 27 (H) 8 - 23 mg/dL    Creatinine 1.15 0.76 - 1.27 mg/dL    EGFR Result 67.6 >60.0 mL/min/1.73    BUN/Creatinine Ratio 23.5 7.0 - 25.0    Sodium 141 136 - 145 mmol/L    Potassium 5.1 3.5 - 5.2 mmol/L    Chloride 103 98 - 107 mmol/L    Total CO2 27.1 22.0 - 29.0 mmol/L    Calcium 9.5 8.6 - 10.5 mg/dL    Total Protein 6.5 6.0 - 8.5 g/dL    Albumin 4.4 3.5 - 5.2 g/dL    Globulin 2.1 gm/dL    A/G Ratio 2.1 g/dL    Total Bilirubin 0.5 0.0 - 1.2 mg/dL    Alkaline Phosphatase 75 39 - 117 U/L    AST (SGOT) 14 1 - 40 U/L    ALT (SGPT) 14 1 - 41 U/L       PE    Physical Exam  Vitals reviewed.   Constitutional:       General: He is not in acute distress.     Appearance: Normal  appearance. He is well-developed and normal weight. He is not ill-appearing or diaphoretic.   HENT:      Head: Normocephalic and atraumatic.   Eyes:      Extraocular Movements: Extraocular movements intact.      Conjunctiva/sclera: Conjunctivae normal.   Cardiovascular:      Rate and Rhythm: Normal rate and regular rhythm.      Heart sounds: Normal heart sounds.   Pulmonary:      Effort: No respiratory distress.   Musculoskeletal:         General: Normal range of motion.      Cervical back: Normal range of motion.      Right lower leg: No edema.      Left lower leg: No edema.   Skin:     General: Skin is warm.      Findings: No erythema or rash.   Neurological:      General: No focal deficit present.      Mental Status: He is alert.   Psychiatric:         Attention and Perception: He is attentive.         Mood and Affect: Mood normal.         Speech: Speech normal.         Behavior: Behavior normal. Behavior is cooperative.         Thought Content: Thought content normal.         Judgment: Judgment normal.           A/P    Diagnoses and all orders for this visit:    1. Essential hypertension (Primary)  -     valsartan-hydrochlorothiazide (DIOVAN-HCT) 160-12.5 MG per tablet; Take 0.5 tablets by mouth Daily.  Dispense: 45 tablet; Refill: 1  Stable, well-controlled.  Compliant on medication.    2. Left hip pain  -     diclofenac (VOLTAREN) 50 MG EC tablet; Take 1 tablet by mouth 2 (Two) Times a Day With Meals.  Dispense: 180 tablet; Refill: 0         Plan of care reviewed with patient at the conclusion of today's visit. Education was provided regarding diagnosis, management and any prescribed or recommended OTC medications.  Patient verbalizes understanding of and agreement with management plan.    Dictated Utilizing Dragon Dictation     Please note that portions of this note were completed with a voice recognition program.     Part of this note may be an electronic transcription/translation of spoken language to  printed text using the Dragon Dictation System.    Return in about 6 months (around 8/22/2024) for Medicare Wellness.     Sally Mann PA-C  Answers submitted by the patient for this visit:  Primary Reason for Visit (Submitted on 2/12/2024)  What is the primary reason for your visit?: Other  Other (Submitted on 2/12/2024)  Please describe your symptoms.: Annual assesment  Have you had these symptoms before?: No  How long have you been having these symptoms?: 1-4 days  Please list any medications you are currently taking for this condition.: N/a  Please describe any probable cause for these symptoms. : None

## 2024-05-07 DIAGNOSIS — J34.89 NASAL DRAINAGE: ICD-10-CM

## 2024-05-09 RX ORDER — IPRATROPIUM BROMIDE 42 UG/1
SPRAY, METERED NASAL
Qty: 15 ML | Refills: 2 | Status: SHIPPED | OUTPATIENT
Start: 2024-05-09

## 2024-05-15 DIAGNOSIS — M25.552 LEFT HIP PAIN: ICD-10-CM

## 2024-05-15 NOTE — TELEPHONE ENCOUNTER
Last office visit 02-  Next office visit 08-    Rx Refill Note  Requested Prescriptions     Pending Prescriptions Disp Refills    diclofenac (VOLTAREN) 50 MG EC tablet [Pharmacy Med Name: DICLOFENAC SODIUM 50MG DR TABLETS] 180 tablet 0     Sig: TAKE 1 TABLET BY MOUTH TWICE DAILY WITH MEALS      Last office visit with prescribing clinician: 2/19/2024   Last telemedicine visit with prescribing clinician: Visit date not found   Next office visit with prescribing clinician: 8/19/2024                         Would you like a call back once the refill request has been completed: [] Yes [] No    If the office needs to give you a call back, can they leave a voicemail: [] Yes [] No    Venus Jones MA  05/15/24, 10:01 EDT

## 2024-06-17 DIAGNOSIS — B00.1 RECURRENT COLD SORES: ICD-10-CM

## 2024-06-17 RX ORDER — ACYCLOVIR 50 MG/G
OINTMENT TOPICAL
Qty: 30 G | Refills: 0 | Status: SHIPPED | OUTPATIENT
Start: 2024-06-17

## 2024-07-13 DIAGNOSIS — I10 ESSENTIAL HYPERTENSION: ICD-10-CM

## 2024-07-15 RX ORDER — VALSARTAN AND HYDROCHLOROTHIAZIDE 160; 12.5 MG/1; MG/1
0.5 TABLET, FILM COATED ORAL DAILY
Qty: 45 TABLET | Refills: 1 | Status: SHIPPED | OUTPATIENT
Start: 2024-07-15

## 2024-07-15 NOTE — TELEPHONE ENCOUNTER
Rx Refill Note  Requested Prescriptions     Pending Prescriptions Disp Refills    valsartan-hydrochlorothiazide (DIOVAN-HCT) 160-12.5 MG per tablet [Pharmacy Med Name: VALSARTAN/HCTZ 160MG/12.5MG TABLETS] 45 tablet 1     Sig: TAKE 1/2 TABLET BY MOUTH DAILY      Last office visit with prescribing clinician: 2/19/2024   Last telemedicine visit with prescribing clinician: Visit date not found   Next office visit with prescribing clinician: 8/19/2024                         Would you like a call back once the refill request has been completed: [] Yes [] No    If the office needs to give you a call back, can they leave a voicemail: [] Yes [] No    Deloris Salmon MA  07/15/24, 13:04 EDT

## 2024-08-02 NOTE — TELEPHONE ENCOUNTER
Rx Refill Note  Requested Prescriptions     Pending Prescriptions Disp Refills    diclofenac (VOLTAREN) 50 MG EC tablet [Pharmacy Med Name: DICLOFENAC SODIUM 50MG DR TABLETS] 60 tablet 2     Sig: TAKE 1 TABLET BY MOUTH TWICE DAILY WITH MEALS      Last office visit with prescribing clinician: 2/8/2023   Last telemedicine visit with prescribing clinician: Visit date not found   Next office visit with prescribing clinician: 8/21/2023                         Would you like a call back once the refill request has been completed: [] Yes [] No    If the office needs to give you a call back, can they leave a voicemail: [] Yes [] No    Suellen Deluca MA  08/08/23, 08:41 EDT  
08-Aug-2024

## 2024-08-13 DIAGNOSIS — M25.552 LEFT HIP PAIN: ICD-10-CM

## 2024-08-14 NOTE — TELEPHONE ENCOUNTER
Rx Refill Note  Requested Prescriptions     Pending Prescriptions Disp Refills    diclofenac (VOLTAREN) 50 MG EC tablet [Pharmacy Med Name: DICLOFENAC SODIUM 50MG DR TABLETS] 180 tablet 0     Sig: TAKE 1 TABLET BY MOUTH TWICE DAILY WITH MEALS      Last office visit with prescribing clinician: 2/19/2024   Last telemedicine visit with prescribing clinician: Visit date not found   Next office visit with prescribing clinician: 8/19/2024     Emi Prater MA  08/14/24, 10:55 EDT

## 2024-08-19 ENCOUNTER — LAB (OUTPATIENT)
Dept: LAB | Facility: HOSPITAL | Age: 74
End: 2024-08-19
Payer: MEDICARE

## 2024-08-19 ENCOUNTER — OFFICE VISIT (OUTPATIENT)
Dept: FAMILY MEDICINE CLINIC | Facility: CLINIC | Age: 74
End: 2024-08-19
Payer: MEDICARE

## 2024-08-19 VITALS
HEART RATE: 57 BPM | TEMPERATURE: 98.1 F | OXYGEN SATURATION: 97 % | WEIGHT: 195 LBS | HEIGHT: 71 IN | SYSTOLIC BLOOD PRESSURE: 128 MMHG | DIASTOLIC BLOOD PRESSURE: 80 MMHG | BODY MASS INDEX: 27.3 KG/M2

## 2024-08-19 DIAGNOSIS — M25.552 PAIN OF LEFT HIP JOINT: ICD-10-CM

## 2024-08-19 DIAGNOSIS — Z00.00 MEDICARE ANNUAL WELLNESS VISIT, SUBSEQUENT: Primary | ICD-10-CM

## 2024-08-19 DIAGNOSIS — E78.2 MIXED HYPERLIPIDEMIA: ICD-10-CM

## 2024-08-19 DIAGNOSIS — R00.1 BRADYCARDIA: ICD-10-CM

## 2024-08-19 DIAGNOSIS — Z12.5 PROSTATE CANCER SCREENING: ICD-10-CM

## 2024-08-19 DIAGNOSIS — Z00.00 PHYSICAL EXAM, ANNUAL: ICD-10-CM

## 2024-08-19 DIAGNOSIS — I10 ESSENTIAL HYPERTENSION: ICD-10-CM

## 2024-08-19 LAB
ALBUMIN SERPL-MCNC: 4.5 G/DL (ref 3.5–5.2)
ALBUMIN/GLOB SERPL: 1.8 G/DL
ALP SERPL-CCNC: 78 U/L (ref 39–117)
ALT SERPL W P-5'-P-CCNC: 19 U/L (ref 1–41)
ANION GAP SERPL CALCULATED.3IONS-SCNC: 11.6 MMOL/L (ref 5–15)
AST SERPL-CCNC: 23 U/L (ref 1–40)
BASOPHILS # BLD AUTO: 0.05 10*3/MM3 (ref 0–0.2)
BASOPHILS NFR BLD AUTO: 0.7 % (ref 0–1.5)
BILIRUB SERPL-MCNC: 0.5 MG/DL (ref 0–1.2)
BUN SERPL-MCNC: 22 MG/DL (ref 8–23)
BUN/CREAT SERPL: 18.6 (ref 7–25)
CALCIUM SPEC-SCNC: 9.6 MG/DL (ref 8.6–10.5)
CHLORIDE SERPL-SCNC: 104 MMOL/L (ref 98–107)
CHOLEST SERPL-MCNC: 148 MG/DL (ref 0–200)
CO2 SERPL-SCNC: 24.4 MMOL/L (ref 22–29)
CREAT SERPL-MCNC: 1.18 MG/DL (ref 0.76–1.27)
DEPRECATED RDW RBC AUTO: 46.1 FL (ref 37–54)
EGFRCR SERPLBLD CKD-EPI 2021: 65.2 ML/MIN/1.73
EOSINOPHIL # BLD AUTO: 0.07 10*3/MM3 (ref 0–0.4)
EOSINOPHIL NFR BLD AUTO: 1 % (ref 0.3–6.2)
ERYTHROCYTE [DISTWIDTH] IN BLOOD BY AUTOMATED COUNT: 12.9 % (ref 12.3–15.4)
GLOBULIN UR ELPH-MCNC: 2.5 GM/DL
GLUCOSE SERPL-MCNC: 94 MG/DL (ref 65–99)
HCT VFR BLD AUTO: 46.5 % (ref 37.5–51)
HDLC SERPL-MCNC: 62 MG/DL (ref 40–60)
HGB BLD-MCNC: 15.8 G/DL (ref 13–17.7)
IMM GRANULOCYTES # BLD AUTO: 0.02 10*3/MM3 (ref 0–0.05)
IMM GRANULOCYTES NFR BLD AUTO: 0.3 % (ref 0–0.5)
LDLC SERPL CALC-MCNC: 73 MG/DL (ref 0–100)
LDLC/HDLC SERPL: 1.17 {RATIO}
LYMPHOCYTES # BLD AUTO: 1.07 10*3/MM3 (ref 0.7–3.1)
LYMPHOCYTES NFR BLD AUTO: 14.9 % (ref 19.6–45.3)
MCH RBC QN AUTO: 32.9 PG (ref 26.6–33)
MCHC RBC AUTO-ENTMCNC: 34 G/DL (ref 31.5–35.7)
MCV RBC AUTO: 96.9 FL (ref 79–97)
MONOCYTES # BLD AUTO: 0.48 10*3/MM3 (ref 0.1–0.9)
MONOCYTES NFR BLD AUTO: 6.7 % (ref 5–12)
NEUTROPHILS NFR BLD AUTO: 5.47 10*3/MM3 (ref 1.7–7)
NEUTROPHILS NFR BLD AUTO: 76.4 % (ref 42.7–76)
NRBC BLD AUTO-RTO: 0 /100 WBC (ref 0–0.2)
PLATELET # BLD AUTO: 167 10*3/MM3 (ref 140–450)
PMV BLD AUTO: 10.6 FL (ref 6–12)
POTASSIUM SERPL-SCNC: 4.6 MMOL/L (ref 3.5–5.2)
PROT SERPL-MCNC: 7 G/DL (ref 6–8.5)
PSA SERPL-MCNC: 3.37 NG/ML (ref 0–4)
RBC # BLD AUTO: 4.8 10*6/MM3 (ref 4.14–5.8)
SODIUM SERPL-SCNC: 140 MMOL/L (ref 136–145)
TRIGL SERPL-MCNC: 67 MG/DL (ref 0–150)
TSH SERPL DL<=0.05 MIU/L-ACNC: 1.42 UIU/ML (ref 0.27–4.2)
VLDLC SERPL-MCNC: 13 MG/DL (ref 5–40)
WBC NRBC COR # BLD AUTO: 7.16 10*3/MM3 (ref 3.4–10.8)

## 2024-08-19 PROCEDURE — G0103 PSA SCREENING: HCPCS

## 2024-08-19 PROCEDURE — 1160F RVW MEDS BY RX/DR IN RCRD: CPT | Performed by: PHYSICIAN ASSISTANT

## 2024-08-19 PROCEDURE — 1126F AMNT PAIN NOTED NONE PRSNT: CPT | Performed by: PHYSICIAN ASSISTANT

## 2024-08-19 PROCEDURE — 80061 LIPID PANEL: CPT

## 2024-08-19 PROCEDURE — 80053 COMPREHEN METABOLIC PANEL: CPT

## 2024-08-19 PROCEDURE — 85025 COMPLETE CBC W/AUTO DIFF WBC: CPT

## 2024-08-19 PROCEDURE — 3074F SYST BP LT 130 MM HG: CPT | Performed by: PHYSICIAN ASSISTANT

## 2024-08-19 PROCEDURE — 3079F DIAST BP 80-89 MM HG: CPT | Performed by: PHYSICIAN ASSISTANT

## 2024-08-19 PROCEDURE — 96160 PT-FOCUSED HLTH RISK ASSMT: CPT | Performed by: PHYSICIAN ASSISTANT

## 2024-08-19 PROCEDURE — 99397 PER PM REEVAL EST PAT 65+ YR: CPT | Performed by: PHYSICIAN ASSISTANT

## 2024-08-19 PROCEDURE — G0439 PPPS, SUBSEQ VISIT: HCPCS | Performed by: PHYSICIAN ASSISTANT

## 2024-08-19 PROCEDURE — 1159F MED LIST DOCD IN RCRD: CPT | Performed by: PHYSICIAN ASSISTANT

## 2024-08-19 PROCEDURE — 84443 ASSAY THYROID STIM HORMONE: CPT

## 2024-08-19 RX ORDER — VALSARTAN AND HYDROCHLOROTHIAZIDE 160; 12.5 MG/1; MG/1
0.5 TABLET, FILM COATED ORAL DAILY
Qty: 45 TABLET | Refills: 3 | Status: SHIPPED | OUTPATIENT
Start: 2024-08-19

## 2024-08-19 NOTE — PROGRESS NOTES
Subjective   The ABCs of the Annual Wellness Visit  Medicare Wellness Visit      Omi Owens is a 74 y.o. patient who presents for a Medicare Wellness Visit.    The following portions of the patient's history were reviewed and   updated as appropriate: allergies, current medications, past family history, past medical history, past social history, past surgical history, and problem list.    Compared to one year ago, the patient's physical   health is worse.  Left hip pain, plans on surgery.  Compared to one year ago, the patient's mental   health is the same.    Recent Hospitalizations:  He was not admitted to the hospital during the last year.     Current Medical Providers:  Patient Care Team:  Sally Mann PA-C as PCP - General (Physician Assistant)  Pedro Ellis MD as Consulting Physician (Cardiology)  Papo Salas MD as Consulting Physician (Orthopedic Surgery)  Rashi Hester OD (Optometry)  Sarah Mcginnis PA (Dermatology)    Outpatient Medications Prior to Visit   Medication Sig Dispense Refill    acyclovir (ZOVIRAX) 5 % ointment APPLY TOPICALLY TO THE COLD SORE ONCE DAILY 30 g 0    aspirin 81 MG EC tablet aspirin 81 mg tablet,delayed release   Take 1 tablet every day by oral route.      diclofenac (VOLTAREN) 50 MG EC tablet TAKE 1 TABLET BY MOUTH TWICE DAILY WITH MEALS 180 tablet 0    ipratropium (ATROVENT) 0.06 % nasal spray INSTILL 2 SPRAYS INTO EACH NOSTRIL FOUR TIMES DAILY 15 mL 2    Loratadine 10 MG capsule Take 1 capsule by mouth Daily.      REPATHA PUSHTRONEX SYSTEM 420 MG/3.5ML solution cartridge       valACYclovir (VALTREX) 1000 MG tablet Take 2 tablets by mouth immediately then take 2 tablets 12 hours later, continue 1 tablet twice daily until cold sore resolves 30 tablet 0    valsartan-hydrochlorothiazide (DIOVAN-HCT) 160-12.5 MG per tablet TAKE 1/2 TABLET BY MOUTH DAILY 45 tablet 1     No facility-administered medications prior to visit.     No  "opioid medication identified on active medication list. I have reviewed chart for other potential  high risk medication/s and harmful drug interactions in the elderly.      Aspirin is on active medication list. Aspirin use is indicated based on review of current medical condition/s. Pros and cons of this therapy have been discussed today. Benefits of this medication outweigh potential harm.  Patient has been encouraged to continue taking this medication.  .      Patient Active Problem List   Diagnosis    Essential hypertension    Seasonal allergies    Mixed hyperlipidemia    Vitamin D deficiency    Bradycardia    Recurrent cold sores    Pain of right hip joint    Pain of left hip joint     Advance Care Planning Advance Directive is not on file.  ACP discussion was declined by the patient. Patient does not have an advance directive, declines further assistance.            Objective   Vitals:    24 0837   BP: 128/80   BP Location: Left arm   Patient Position: Sitting   Cuff Size: Adult   Pulse: 57   Temp: 98.1 °F (36.7 °C)   TempSrc: Oral   SpO2: 97%   Weight: 88.5 kg (195 lb)   Height: 180.3 cm (70.98\")       Estimated body mass index is 27.21 kg/m² as calculated from the following:    Height as of this encounter: 180.3 cm (70.98\").    Weight as of this encounter: 88.5 kg (195 lb).            Does the patient have evidence of cognitive impairment? No     Patient declines cognitive screening today.  He has no signs or symptoms of memory loss.                                                                                             Health  Risk Assessment    Smoking Status:  Social History     Tobacco Use   Smoking Status Former    Current packs/day: 0.00    Average packs/day: 0.3 packs/day for 5.2 years (1.3 ttl pk-yrs)    Types: Cigarettes    Start date: 1969    Quit date: 1974    Years since quittin.6    Passive exposure: Never   Smokeless Tobacco Never     Alcohol Consumption:  Social History "     Substance and Sexual Activity   Alcohol Use Yes    Alcohol/week: 12.0 standard drinks of alcohol    Types: 12 Drinks containing 0.5 oz of alcohol per week       Fall Risk Screen  STEADI Fall Risk Assessment was completed, and patient is at LOW risk for falls.Assessment completed on:2024    Depression Screenin/19/2024     8:36 AM   PHQ-2/PHQ-9 Depression Screening   Little Interest or Pleasure in Doing Things 0-->not at all   Feeling Down, Depressed or Hopeless 0-->not at all   PHQ-9: Brief Depression Severity Measure Score 0     Health Habits and Functional and Cognitive Screenin/12/2024     7:52 AM   Functional & Cognitive Status   Do you have difficulty preparing food and eating? No   Do you have difficulty bathing yourself, getting dressed or grooming yourself? No   Do you have difficulty using the toilet? No   Do you have difficulty moving around from place to place? No   Do you have trouble with steps or getting out of a bed or a chair? No   Current Diet Well Balanced Diet   Dental Exam Up to date   Eye Exam Up to date   Exercise (times per week) 5 times per week   Current Exercises Include Hiking;Running/Jogging;Swimming;Walking   Do you need help using the phone?  No   Are you deaf or do you have serious difficulty hearing?  No   Do you need help to go to places out of walking distance? No   Do you need help shopping? No   Do you need help preparing meals?  No   Do you need help with housework?  No   Do you need help with laundry? No   Do you need help taking your medications? No   Do you need help managing money? No   Do you ever drive or ride in a car without wearing a seat belt? No   Have you felt unusual stress, anger or loneliness in the last month? No   Who do you live with? Spouse   If you need help, do you have trouble finding someone available to you? No   Have you been bothered in the last four weeks by sexual problems? No   Do you have difficulty concentrating,  remembering or making decisions? No           Age-appropriate Screening Schedule:  Refer to the list below for future screening recommendations based on patient's age, sex and/or medical conditions. Orders for these recommended tests are listed in the plan section. The patient has been provided with a written plan.    Health Maintenance List  Health Maintenance   Topic Date Due    TDAP/TD VACCINES (2 - Td or Tdap) 02/03/2022    COVID-19 Vaccine (7 - 2023-24 season) 02/20/2024    INFLUENZA VACCINE  08/01/2024    LIPID PANEL  08/22/2024    BMI FOLLOWUP  08/21/2024    ANNUAL WELLNESS VISIT  08/19/2025    COLORECTAL CANCER SCREENING  12/02/2026    HEPATITIS C SCREENING  Completed    Pneumococcal Vaccine 65+  Completed    AAA SCREEN (ONE-TIME)  Completed    ZOSTER VACCINE  Completed                                                                                                                                                CMS Preventative Services Quick Reference  Risk Factors Identified During Encounter  Immunizations Discussed/Encouraged: Tdap, Influenza, COVID19, and RSV (Respiratory Syncytial Virus)  Dental Screening Recommended  Vision Screening Recommended    The above risks/problems have been discussed with the patient.  Pertinent information has been shared with the patient in the After Visit Summary.  An After Visit Summary and PPPS were made available to the patient.    Follow Up:   Next Medicare Wellness visit to be scheduled in 1 year.         Additional E&M Note during same encounter follows:  Patient has additional, significant, and separately identifiable condition(s)/problem(s) that require work above and beyond the Medicare Wellness Visit     Chief Complaint  Hip Pain (Patient states the pain in his left hip has progressively gotten worse ), Head Injury (Patient hit his head plumbing ), Medicare Wellness-subsequent, and Annual Exam    Subjective   DELLA  Omi is also being seen today for an annual adult  "preventative physical exam.   Patient is doing well today overall.  He is very active and plays golf regularly.  He has had his right hip replaced by Dr. Salas and did great.  He is having more pain in his left hip and plans on going to Dr. Salas to see about a replacement this Winter.  Patient's blood pressure is stable and well-controlled.  He is compliant on all medications.  Followed by cardiology.  Prescribed Repatha and taking as recommended.  Going to ophthalmologist, dentist and dermatologist.  Colonoscopy is up-to-date.  Reviewed vaccinations.  Review of Systems   Constitutional:  Negative for chills, fatigue and fever.   HENT:  Negative for congestion, ear pain, postnasal drip, rhinorrhea, sinus pressure and sore throat.    Eyes:  Negative for visual disturbance.   Respiratory:  Negative for cough, shortness of breath and wheezing.    Cardiovascular:  Negative for chest pain, palpitations and leg swelling.   Gastrointestinal:  Negative for abdominal pain, blood in stool, diarrhea, nausea and vomiting.   Endocrine: Negative for polyuria.   Genitourinary:  Negative for dysuria, flank pain, frequency and hematuria.   Musculoskeletal:  Positive for arthralgias. Negative for gait problem and myalgias.   Skin:  Negative for rash.   Neurological:  Negative for dizziness and confusion.   Hematological:  Does not bruise/bleed easily.   Psychiatric/Behavioral:  Negative for agitation, dysphoric mood, self-injury, sleep disturbance and suicidal ideas. The patient is not nervous/anxious.               Objective   Vital Signs:  /80 (BP Location: Left arm, Patient Position: Sitting, Cuff Size: Adult)   Pulse 57   Temp 98.1 °F (36.7 °C) (Oral)   Ht 180.3 cm (70.98\")   Wt 88.5 kg (195 lb)   SpO2 97%   BMI 27.21 kg/m²   Physical Exam  Vitals reviewed.   Constitutional:       General: He is not in acute distress.     Appearance: Normal appearance. He is well-developed and normal weight. He is not " ill-appearing or diaphoretic.   HENT:      Head: Normocephalic and atraumatic.      Right Ear: Hearing, tympanic membrane, ear canal and external ear normal.      Left Ear: Hearing, tympanic membrane, ear canal and external ear normal.      Nose: Nose normal.      Right Sinus: No maxillary sinus tenderness or frontal sinus tenderness.      Left Sinus: No maxillary sinus tenderness or frontal sinus tenderness.      Mouth/Throat:      Pharynx: Uvula midline.   Eyes:      General: Lids are normal.      Extraocular Movements: Extraocular movements intact.      Conjunctiva/sclera: Conjunctivae normal.   Neck:      Thyroid: No thyroid mass or thyromegaly.      Trachea: Trachea and phonation normal.   Cardiovascular:      Rate and Rhythm: Normal rate and regular rhythm.      Heart sounds: Normal heart sounds.   Pulmonary:      Effort: Pulmonary effort is normal.      Breath sounds: Normal breath sounds.   Abdominal:      General: Bowel sounds are normal. There is no distension.      Palpations: Abdomen is soft. Abdomen is not rigid.      Tenderness: There is no abdominal tenderness. There is no guarding.   Musculoskeletal:         General: Normal range of motion.      Cervical back: Normal range of motion.      Right lower leg: No edema.      Left lower leg: No edema.   Lymphadenopathy:      Cervical: No cervical adenopathy.      Right cervical: No superficial cervical adenopathy.     Left cervical: No superficial cervical adenopathy.   Skin:     General: Skin is warm.      Findings: No erythema or rash.      Nails: There is no clubbing.   Neurological:      Mental Status: He is alert and oriented to person, place, and time.      Coordination: Coordination normal.      Gait: Gait normal.      Deep Tendon Reflexes: Reflexes are normal and symmetric.      Comments: CN grossly intact   Psychiatric:         Attention and Perception: Attention and perception normal. He is attentive.         Mood and Affect: Mood and affect  normal.         Speech: Speech normal.         Behavior: Behavior normal. Behavior is cooperative.         Thought Content: Thought content normal.         Cognition and Memory: Cognition and memory normal.         Judgment: Judgment normal.         The following data was reviewed by: Sally Mann PA-C on 08/19/2024:        Assessment and Plan Additional age appropriate preventative wellness advice topics were discussed during today's preventative wellness exam(some topics already addressed during AWV portion of the note above):    Physical Activity: Advised cardiovascular activity 150 minutes per week as tolerated. (example brisk walk for 30 minutes, 5 days a week).     Nutrition: Discussed nutrition plan with patient. Information shared in after visit summary. Goal is for a well balanced diet to enhance overall health.              Medicare annual wellness visit, subsequent    Physical exam, annual  PE completed  Preventative labs ordered  Colonoscopy is up-to-date  Dentist - encouraged to go regularly  Ophthalmologist - encouraged to go regularly  Dermatologist - established  Vaccinations discussed    Essential hypertension  Hypertension is stable and controlled  Continue current treatment regimen.  Blood pressure will be reassessed in 6 months.  Bradycardia  Asymptomatic.  Followed by cardiology.    Pain of left hip joint  Established with Dr. Salas, will call his office to schedule appointment.  Mixed hyperlipidemia   Lipid abnormalities are stable    Plan:  Continue same medication/s without change.      Discussed medication dosage, use, side effects, and goals of treatment in detail.    Counseled patient on lifestyle modifications to help control hyperlipidemia.     Patient Treatment Goals:   LDL goal is less than 70    Followup in 6 months.  Prostate cancer screening      Orders Placed This Encounter   Procedures    CBC Auto Differential     Standing Status:   Future     Number of  Occurrences:   1     Standing Expiration Date:   8/19/2025     Order Specific Question:   Release to patient     Answer:   Routine Release [[id number]]    Comprehensive Metabolic Panel     Standing Status:   Future     Number of Occurrences:   1     Standing Expiration Date:   8/19/2025     Order Specific Question:   Release to patient     Answer:   Routine Release [[id number]]    TSH Rfx On Abnormal To Free T4     Standing Status:   Future     Number of Occurrences:   1     Standing Expiration Date:   8/19/2025     Order Specific Question:   Release to patient     Answer:   Routine Release [[id number]]    Lipid Panel     Standing Status:   Future     Number of Occurrences:   1     Standing Expiration Date:   8/19/2025     Order Specific Question:   Release to patient     Answer:   Routine Release [[id number]]    PSA Screen     Standing Status:   Future     Number of Occurrences:   1     Standing Expiration Date:   8/19/2025     Order Specific Question:   Release to patient     Answer:   Routine Release [[id number]]     New Medications Ordered This Visit   Medications    valsartan-hydrochlorothiazide (DIOVAN-HCT) 160-12.5 MG per tablet     Sig: Take 0.5 tablets by mouth Daily.     Dispense:  45 tablet     Refill:  3        I spent 35 minutes caring for Omi on this date of service. This time includes time spent by me in the following activities:preparing for the visit, reviewing tests, obtaining and/or reviewing a separately obtained history, performing a medically appropriate examination and/or evaluation , counseling and educating the patient/family/caregiver, ordering medications, tests, or procedures, documenting information in the medical record, independently interpreting results and communicating that information with the patient/family/caregiver, and care coordination  Follow Up   Return in about 6 months (around 2/19/2025) for Recheck, 6 month follow-up.  Patient was given instructions and counseling  regarding his condition or for health maintenance advice. Please see specific information pulled into the AVS if appropriate.

## 2024-11-11 DIAGNOSIS — M25.552 LEFT HIP PAIN: ICD-10-CM

## 2024-11-13 DIAGNOSIS — J34.89 NASAL DRAINAGE: ICD-10-CM

## 2024-11-13 RX ORDER — IPRATROPIUM BROMIDE 42 UG/1
SPRAY, METERED NASAL
Qty: 15 ML | Refills: 2 | Status: SHIPPED | OUTPATIENT
Start: 2024-11-13

## 2025-02-13 DIAGNOSIS — M25.552 LEFT HIP PAIN: ICD-10-CM

## 2025-02-19 ENCOUNTER — LAB (OUTPATIENT)
Dept: LAB | Facility: HOSPITAL | Age: 75
End: 2025-02-19
Payer: MEDICARE

## 2025-02-19 ENCOUNTER — OFFICE VISIT (OUTPATIENT)
Dept: FAMILY MEDICINE CLINIC | Facility: CLINIC | Age: 75
End: 2025-02-19
Payer: MEDICARE

## 2025-02-19 VITALS
HEART RATE: 56 BPM | OXYGEN SATURATION: 98 % | RESPIRATION RATE: 18 BRPM | BODY MASS INDEX: 27.89 KG/M2 | WEIGHT: 199.2 LBS | DIASTOLIC BLOOD PRESSURE: 78 MMHG | TEMPERATURE: 97.1 F | HEIGHT: 71 IN | SYSTOLIC BLOOD PRESSURE: 120 MMHG

## 2025-02-19 DIAGNOSIS — I10 ESSENTIAL HYPERTENSION: ICD-10-CM

## 2025-02-19 DIAGNOSIS — I10 ESSENTIAL HYPERTENSION: Primary | ICD-10-CM

## 2025-02-19 DIAGNOSIS — E78.2 MIXED HYPERLIPIDEMIA: ICD-10-CM

## 2025-02-19 LAB
ALBUMIN SERPL-MCNC: 4 G/DL (ref 3.5–5.2)
ALBUMIN/GLOB SERPL: 1.2 G/DL
ALP SERPL-CCNC: 97 U/L (ref 39–117)
ALT SERPL W P-5'-P-CCNC: 22 U/L (ref 1–41)
ANION GAP SERPL CALCULATED.3IONS-SCNC: 10.2 MMOL/L (ref 5–15)
AST SERPL-CCNC: 19 U/L (ref 1–40)
BILIRUB SERPL-MCNC: 0.5 MG/DL (ref 0–1.2)
BUN SERPL-MCNC: 21 MG/DL (ref 8–23)
BUN/CREAT SERPL: 15.3 (ref 7–25)
CALCIUM SPEC-SCNC: 9.9 MG/DL (ref 8.6–10.5)
CHLORIDE SERPL-SCNC: 103 MMOL/L (ref 98–107)
CO2 SERPL-SCNC: 25.8 MMOL/L (ref 22–29)
CREAT SERPL-MCNC: 1.37 MG/DL (ref 0.76–1.27)
EGFRCR SERPLBLD CKD-EPI 2021: 54.5 ML/MIN/1.73
GLOBULIN UR ELPH-MCNC: 3.4 GM/DL
GLUCOSE SERPL-MCNC: 98 MG/DL (ref 65–99)
POTASSIUM SERPL-SCNC: 5.3 MMOL/L (ref 3.5–5.2)
PROT SERPL-MCNC: 7.4 G/DL (ref 6–8.5)
SODIUM SERPL-SCNC: 139 MMOL/L (ref 136–145)

## 2025-02-19 PROCEDURE — 3074F SYST BP LT 130 MM HG: CPT | Performed by: PHYSICIAN ASSISTANT

## 2025-02-19 PROCEDURE — 1159F MED LIST DOCD IN RCRD: CPT | Performed by: PHYSICIAN ASSISTANT

## 2025-02-19 PROCEDURE — 80053 COMPREHEN METABOLIC PANEL: CPT

## 2025-02-19 PROCEDURE — 3078F DIAST BP <80 MM HG: CPT | Performed by: PHYSICIAN ASSISTANT

## 2025-02-19 PROCEDURE — 1160F RVW MEDS BY RX/DR IN RCRD: CPT | Performed by: PHYSICIAN ASSISTANT

## 2025-02-19 PROCEDURE — 99213 OFFICE O/P EST LOW 20 MIN: CPT | Performed by: PHYSICIAN ASSISTANT

## 2025-02-19 PROCEDURE — 1126F AMNT PAIN NOTED NONE PRSNT: CPT | Performed by: PHYSICIAN ASSISTANT

## 2025-02-19 PROCEDURE — G2211 COMPLEX E/M VISIT ADD ON: HCPCS | Performed by: PHYSICIAN ASSISTANT

## 2025-02-19 NOTE — PROGRESS NOTES
Chief Complaint   Patient presents with    Hypertension     6 month FU    Hyperlipidemia     6 month FU       Omi Owens is a pleasant 74 y.o. male who is here for routine follow-up of hypertension.  Patient is doing well today and has no concerns.  He is compliant on blood pressure medication and tolerating well.  His blood pressure is stable and well-controlled.  He is followed by cardiology and they manages Repatha.  He recently had a hip replacement and is doing great 3 weeks postop.    Past Medical History:   Diagnosis Date    Allergic     Arthritis     Colon polyp     Gastroesophageal reflux disease 2021    Hyperlipidemia     Hypertension     Loss of taste 2021    Right hip pain 09/10/2021       Past Surgical History:   Procedure Laterality Date    CORONARY STENT PLACEMENT  2015    EYE SURGERY      HERNIA REPAIR      JOINT REPLACEMENT      VASECTOMY         Family History   Problem Relation Age of Onset    Heart attack Mother     Hearing loss Mother     Heart attack Father     Hyperlipidemia Father        Social History     Socioeconomic History    Marital status:    Tobacco Use    Smoking status: Former     Current packs/day: 0.00     Average packs/day: 0.3 packs/day for 5.2 years (1.3 ttl pk-yrs)     Types: Cigarettes     Start date: 1969     Quit date: 1974     Years since quittin.1     Passive exposure: Never    Smokeless tobacco: Never   Vaping Use    Vaping status: Never Used   Substance and Sexual Activity    Alcohol use: Yes     Alcohol/week: 12.0 standard drinks of alcohol     Types: 12 Drinks containing 0.5 oz of alcohol per week    Drug use: No    Sexual activity: Yes     Partners: Female     Birth control/protection: Vasectomy       Allergies   Allergen Reactions    Statins Myalgia       ROS  Review of Systems   Constitutional:  Negative for chills, diaphoresis, fatigue and fever.   HENT:  Negative for congestion, sore throat and swollen glands.    Respiratory:   "Negative for cough.    Cardiovascular:  Negative for chest pain.   Gastrointestinal:  Negative for abdominal pain, nausea and vomiting.   Genitourinary:  Negative for dysuria.   Musculoskeletal:  Negative for arthralgias, myalgias and neck pain.   Skin:  Negative for rash.   Neurological:  Negative for weakness and numbness.       Vitals:    02/19/25 0841   BP: 120/78   BP Location: Left arm   Patient Position: Sitting   Cuff Size: Adult   Pulse: 56   Resp: 18   Temp: 97.1 °F (36.2 °C)   TempSrc: Temporal   SpO2: 98%   Weight: 90.4 kg (199 lb 3.2 oz)   Height: 180.3 cm (70.98\")   PainSc: 0-No pain     Body mass index is 27.8 kg/m².      Current Outpatient Medications on File Prior to Visit   Medication Sig Dispense Refill    acyclovir (ZOVIRAX) 5 % ointment APPLY TOPICALLY TO THE COLD SORE ONCE DAILY 30 g 0    aspirin 81 MG EC tablet aspirin 81 mg tablet,delayed release   Take 1 tablet every day by oral route.      diclofenac (VOLTAREN) 50 MG EC tablet TAKE 1 TABLET BY MOUTH TWICE DAILY WITH MEALS 180 tablet 0    ipratropium (ATROVENT) 0.06 % nasal spray INSTILL 2 SPRAYS INTO EACH NOSTRIL FOUR TIMES DAILY 15 mL 2    Loratadine 10 MG capsule Take 1 capsule by mouth Daily.      REPATHA PUSHTRONEX SYSTEM 420 MG/3.5ML solution cartridge       valACYclovir (VALTREX) 1000 MG tablet Take 2 tablets by mouth immediately then take 2 tablets 12 hours later, continue 1 tablet twice daily until cold sore resolves 30 tablet 0    valsartan-hydrochlorothiazide (DIOVAN-HCT) 160-12.5 MG per tablet Take 0.5 tablets by mouth Daily. 45 tablet 3     No current facility-administered medications on file prior to visit.       Results for orders placed or performed in visit on 08/19/24   CBC Auto Differential    Collection Time: 08/19/24  9:15 AM    Specimen: Blood   Result Value Ref Range    WBC 7.16 3.40 - 10.80 10*3/mm3    RBC 4.80 4.14 - 5.80 10*6/mm3    Hemoglobin 15.8 13.0 - 17.7 g/dL    Hematocrit 46.5 37.5 - 51.0 %    MCV 96.9 79.0 - " 97.0 fL    MCH 32.9 26.6 - 33.0 pg    MCHC 34.0 31.5 - 35.7 g/dL    RDW 12.9 12.3 - 15.4 %    RDW-SD 46.1 37.0 - 54.0 fl    MPV 10.6 6.0 - 12.0 fL    Platelets 167 140 - 450 10*3/mm3    Neutrophil % 76.4 (H) 42.7 - 76.0 %    Lymphocyte % 14.9 (L) 19.6 - 45.3 %    Monocyte % 6.7 5.0 - 12.0 %    Eosinophil % 1.0 0.3 - 6.2 %    Basophil % 0.7 0.0 - 1.5 %    Immature Grans % 0.3 0.0 - 0.5 %    Neutrophils, Absolute 5.47 1.70 - 7.00 10*3/mm3    Lymphocytes, Absolute 1.07 0.70 - 3.10 10*3/mm3    Monocytes, Absolute 0.48 0.10 - 0.90 10*3/mm3    Eosinophils, Absolute 0.07 0.00 - 0.40 10*3/mm3    Basophils, Absolute 0.05 0.00 - 0.20 10*3/mm3    Immature Grans, Absolute 0.02 0.00 - 0.05 10*3/mm3    nRBC 0.0 0.0 - 0.2 /100 WBC   Comprehensive Metabolic Panel    Collection Time: 08/19/24  9:15 AM    Specimen: Blood   Result Value Ref Range    Glucose 94 65 - 99 mg/dL    BUN 22 8 - 23 mg/dL    Creatinine 1.18 0.76 - 1.27 mg/dL    Sodium 140 136 - 145 mmol/L    Potassium 4.6 3.5 - 5.2 mmol/L    Chloride 104 98 - 107 mmol/L    CO2 24.4 22.0 - 29.0 mmol/L    Calcium 9.6 8.6 - 10.5 mg/dL    Total Protein 7.0 6.0 - 8.5 g/dL    Albumin 4.5 3.5 - 5.2 g/dL    ALT (SGPT) 19 1 - 41 U/L    AST (SGOT) 23 1 - 40 U/L    Alkaline Phosphatase 78 39 - 117 U/L    Total Bilirubin 0.5 0.0 - 1.2 mg/dL    Globulin 2.5 gm/dL    A/G Ratio 1.8 g/dL    BUN/Creatinine Ratio 18.6 7.0 - 25.0    Anion Gap 11.6 5.0 - 15.0 mmol/L    eGFR 65.2 >60.0 mL/min/1.73   TSH Rfx On Abnormal To Free T4    Collection Time: 08/19/24  9:15 AM    Specimen: Blood   Result Value Ref Range    TSH 1.420 0.270 - 4.200 uIU/mL   Lipid Panel    Collection Time: 08/19/24  9:15 AM    Specimen: Blood   Result Value Ref Range    Total Cholesterol 148 0 - 200 mg/dL    Triglycerides 67 0 - 150 mg/dL    HDL Cholesterol 62 (H) 40 - 60 mg/dL    LDL Cholesterol  73 0 - 100 mg/dL    VLDL Cholesterol 13 5 - 40 mg/dL    LDL/HDL Ratio 1.17    PSA Screen    Collection Time: 08/19/24  9:15 AM     Specimen: Blood   Result Value Ref Range    PSA 3.370 0.000 - 4.000 ng/mL       PE    Physical Exam  Vitals reviewed.   Constitutional:       General: He is not in acute distress.     Appearance: Normal appearance. He is well-developed and normal weight. He is not ill-appearing or diaphoretic.   HENT:      Head: Normocephalic and atraumatic.   Eyes:      Extraocular Movements: Extraocular movements intact.      Conjunctiva/sclera: Conjunctivae normal.   Cardiovascular:      Rate and Rhythm: Normal rate and regular rhythm.      Heart sounds: Normal heart sounds.   Pulmonary:      Effort: Pulmonary effort is normal. No respiratory distress.   Musculoskeletal:         General: Normal range of motion.      Cervical back: Normal range of motion.      Right lower leg: No edema.      Left lower leg: No edema.   Skin:     General: Skin is warm.      Findings: No erythema or rash.   Neurological:      General: No focal deficit present.      Mental Status: He is alert.   Psychiatric:         Attention and Perception: He is attentive.         Mood and Affect: Mood normal.         Speech: Speech normal.         Behavior: Behavior normal. Behavior is cooperative.         Thought Content: Thought content normal.         Judgment: Judgment normal.           A/P    Diagnoses and all orders for this visit:    1. Essential hypertension (Primary)  -     Comprehensive Metabolic Panel; Future  Stable, well-controlled.  Compliant on medication.    2. Mixed hyperlipidemia  On Repatha - managed by his cardiologist.  Recent cholesterol labs look great.       Plan of care reviewed with patient at the conclusion of today's visit. Education was provided regarding diagnosis, management and any prescribed or recommended OTC medications.  Patient verbalizes understanding of and agreement with management plan.    Dictated Utilizing Dragon Dictation     Please note that portions of this note were completed with a voice recognition program.     Part  of this note may be an electronic transcription/translation of spoken language to printed text using the Dragon Dictation System.    Return in about 26 weeks (around 8/20/2025) for Medicare Wellness.     Sally Mann PA-C  Answers submitted by the patient for this visit:  Problem not listed (Submitted on 2/18/2025)  Chief Complaint: Other medical problem  Reason for appointment: Wellness exam  anorexia: No  joint pain: No  change in stool: No  headaches: No  joint swelling: No  vertigo: No  visual change: No  Onset: at an unknown time

## 2025-05-01 DIAGNOSIS — J34.89 NASAL DRAINAGE: ICD-10-CM

## 2025-05-02 RX ORDER — IPRATROPIUM BROMIDE 42 UG/1
SPRAY, METERED NASAL
Qty: 15 ML | Refills: 2 | Status: SHIPPED | OUTPATIENT
Start: 2025-05-02

## 2025-05-15 DIAGNOSIS — M25.552 LEFT HIP PAIN: Primary | ICD-10-CM

## 2025-05-15 NOTE — TELEPHONE ENCOUNTER
Rx Refill Note  Requested Prescriptions     Pending Prescriptions Disp Refills    diclofenac (VOLTAREN) 50 MG EC tablet [Pharmacy Med Name: DICLOFENAC SODIUM 50MG DR TABLETS] 180 tablet      Sig: TAKE 1 TABLET BY MOUTH TWICE DAILY WITH MEALS      Last office visit with prescribing clinician: 2/19/2025   Last telemedicine visit with prescribing clinician: Visit date not found   Next office visit with prescribing clinician: 8/20/2025                         Would you like a call back once the refill request has been completed: [] Yes [] No    If the office needs to give you a call back, can they leave a voicemail: [] Yes [] No    Maggi Campbell MA  05/15/25, 12:10 EDT

## 2025-08-13 DIAGNOSIS — R73.02 IMPAIRED GLUCOSE TOLERANCE: ICD-10-CM

## 2025-08-13 DIAGNOSIS — E78.2 MIXED HYPERLIPIDEMIA: ICD-10-CM

## 2025-08-13 DIAGNOSIS — E55.9 VITAMIN D DEFICIENCY: ICD-10-CM

## 2025-08-13 DIAGNOSIS — Z12.5 PROSTATE CANCER SCREENING: ICD-10-CM

## 2025-08-13 DIAGNOSIS — I10 ESSENTIAL HYPERTENSION: Primary | ICD-10-CM

## 2025-08-14 DIAGNOSIS — M25.552 LEFT HIP PAIN: ICD-10-CM

## 2025-08-20 ENCOUNTER — LAB (OUTPATIENT)
Dept: LAB | Facility: HOSPITAL | Age: 75
End: 2025-08-20
Payer: MEDICARE

## 2025-08-20 ENCOUNTER — OFFICE VISIT (OUTPATIENT)
Dept: FAMILY MEDICINE CLINIC | Facility: CLINIC | Age: 75
End: 2025-08-20
Payer: MEDICARE

## 2025-08-20 VITALS
OXYGEN SATURATION: 98 % | DIASTOLIC BLOOD PRESSURE: 70 MMHG | BODY MASS INDEX: 27.41 KG/M2 | HEART RATE: 88 BPM | WEIGHT: 195.8 LBS | HEIGHT: 71 IN | SYSTOLIC BLOOD PRESSURE: 130 MMHG

## 2025-08-20 DIAGNOSIS — I10 ESSENTIAL HYPERTENSION: ICD-10-CM

## 2025-08-20 DIAGNOSIS — Z12.5 PROSTATE CANCER SCREENING: ICD-10-CM

## 2025-08-20 DIAGNOSIS — J30.2 SEASONAL ALLERGIES: ICD-10-CM

## 2025-08-20 DIAGNOSIS — R73.02 IMPAIRED GLUCOSE TOLERANCE: ICD-10-CM

## 2025-08-20 DIAGNOSIS — E78.2 MIXED HYPERLIPIDEMIA: ICD-10-CM

## 2025-08-20 DIAGNOSIS — E87.5 HYPERKALEMIA: Primary | ICD-10-CM

## 2025-08-20 DIAGNOSIS — M25.552 LEFT HIP PAIN: ICD-10-CM

## 2025-08-20 DIAGNOSIS — E55.9 VITAMIN D DEFICIENCY: ICD-10-CM

## 2025-08-20 DIAGNOSIS — Z00.00 PHYSICAL EXAM, ANNUAL: ICD-10-CM

## 2025-08-20 DIAGNOSIS — Z00.00 MEDICARE ANNUAL WELLNESS VISIT, SUBSEQUENT: Primary | ICD-10-CM

## 2025-08-20 LAB
25(OH)D3 SERPL-MCNC: 78.5 NG/ML (ref 30–100)
ALBUMIN SERPL-MCNC: 4.3 G/DL (ref 3.5–5.2)
ALBUMIN/GLOB SERPL: 1.5 G/DL
ALP SERPL-CCNC: 75 U/L (ref 39–117)
ALT SERPL W P-5'-P-CCNC: 14 U/L (ref 1–41)
ANION GAP SERPL CALCULATED.3IONS-SCNC: 9.4 MMOL/L (ref 5–15)
AST SERPL-CCNC: 17 U/L (ref 1–40)
BASOPHILS # BLD AUTO: 0.07 10*3/MM3 (ref 0–0.2)
BASOPHILS NFR BLD AUTO: 0.9 % (ref 0–1.5)
BILIRUB SERPL-MCNC: 0.5 MG/DL (ref 0–1.2)
BUN SERPL-MCNC: 19 MG/DL (ref 8–23)
BUN/CREAT SERPL: 16.4 (ref 7–25)
CALCIUM SPEC-SCNC: 9.8 MG/DL (ref 8.6–10.5)
CHLORIDE SERPL-SCNC: 106 MMOL/L (ref 98–107)
CHOLEST SERPL-MCNC: 142 MG/DL (ref 0–200)
CO2 SERPL-SCNC: 25.6 MMOL/L (ref 22–29)
CREAT SERPL-MCNC: 1.16 MG/DL (ref 0.76–1.27)
DEPRECATED RDW RBC AUTO: 45.9 FL (ref 37–54)
EGFRCR SERPLBLD CKD-EPI 2021: 66.1 ML/MIN/1.73
EOSINOPHIL # BLD AUTO: 0.05 10*3/MM3 (ref 0–0.4)
EOSINOPHIL NFR BLD AUTO: 0.6 % (ref 0.3–6.2)
ERYTHROCYTE [DISTWIDTH] IN BLOOD BY AUTOMATED COUNT: 13 % (ref 12.3–15.4)
GLOBULIN UR ELPH-MCNC: 2.9 GM/DL
GLUCOSE SERPL-MCNC: 105 MG/DL (ref 65–99)
HBA1C MFR BLD: 5.1 % (ref 4.8–5.6)
HCT VFR BLD AUTO: 47.9 % (ref 37.5–51)
HDLC SERPL-MCNC: 61 MG/DL (ref 40–60)
HGB BLD-MCNC: 16.3 G/DL (ref 13–17.7)
IMM GRANULOCYTES # BLD AUTO: 0.05 10*3/MM3 (ref 0–0.05)
IMM GRANULOCYTES NFR BLD AUTO: 0.6 % (ref 0–0.5)
LDLC SERPL CALC-MCNC: 64 MG/DL (ref 0–100)
LDLC/HDLC SERPL: 1.03 {RATIO}
LYMPHOCYTES # BLD AUTO: 1.11 10*3/MM3 (ref 0.7–3.1)
LYMPHOCYTES NFR BLD AUTO: 14.1 % (ref 19.6–45.3)
MCH RBC QN AUTO: 32.7 PG (ref 26.6–33)
MCHC RBC AUTO-ENTMCNC: 34 G/DL (ref 31.5–35.7)
MCV RBC AUTO: 96.2 FL (ref 79–97)
MONOCYTES # BLD AUTO: 0.47 10*3/MM3 (ref 0.1–0.9)
MONOCYTES NFR BLD AUTO: 6 % (ref 5–12)
NEUTROPHILS NFR BLD AUTO: 6.14 10*3/MM3 (ref 1.7–7)
NEUTROPHILS NFR BLD AUTO: 77.8 % (ref 42.7–76)
NRBC BLD AUTO-RTO: 0 /100 WBC (ref 0–0.2)
PLATELET # BLD AUTO: 175 10*3/MM3 (ref 140–450)
PMV BLD AUTO: 10.7 FL (ref 6–12)
POTASSIUM SERPL-SCNC: 5.3 MMOL/L (ref 3.5–5.2)
PROT SERPL-MCNC: 7.2 G/DL (ref 6–8.5)
PSA SERPL-MCNC: 1.74 NG/ML (ref 0–4)
RBC # BLD AUTO: 4.98 10*6/MM3 (ref 4.14–5.8)
SODIUM SERPL-SCNC: 141 MMOL/L (ref 136–145)
TRIGL SERPL-MCNC: 92 MG/DL (ref 0–150)
TSH SERPL DL<=0.05 MIU/L-ACNC: 1.66 UIU/ML (ref 0.27–4.2)
VLDLC SERPL-MCNC: 17 MG/DL (ref 5–40)
WBC NRBC COR # BLD AUTO: 7.89 10*3/MM3 (ref 3.4–10.8)

## 2025-08-20 PROCEDURE — 83036 HEMOGLOBIN GLYCOSYLATED A1C: CPT

## 2025-08-20 PROCEDURE — 80061 LIPID PANEL: CPT

## 2025-08-20 PROCEDURE — 84443 ASSAY THYROID STIM HORMONE: CPT

## 2025-08-20 PROCEDURE — 82306 VITAMIN D 25 HYDROXY: CPT

## 2025-08-20 PROCEDURE — 85025 COMPLETE CBC W/AUTO DIFF WBC: CPT

## 2025-08-20 PROCEDURE — 80053 COMPREHEN METABOLIC PANEL: CPT

## 2025-08-20 PROCEDURE — G0103 PSA SCREENING: HCPCS

## 2025-08-20 RX ORDER — MOMETASONE FUROATE MONOHYDRATE 50 UG/1
2 SPRAY, METERED NASAL DAILY
COMMUNITY
End: 2025-08-20 | Stop reason: SDUPTHER

## 2025-08-20 RX ORDER — MOMETASONE FUROATE MONOHYDRATE 50 UG/1
2 SPRAY, METERED NASAL DAILY
Qty: 17 G | Refills: 1 | Status: SHIPPED | OUTPATIENT
Start: 2025-08-20

## 2025-08-20 RX ORDER — VALSARTAN AND HYDROCHLOROTHIAZIDE 160; 12.5 MG/1; MG/1
0.5 TABLET, FILM COATED ORAL DAILY
Qty: 45 TABLET | Refills: 3 | Status: SHIPPED | OUTPATIENT
Start: 2025-08-20

## 2025-08-20 RX ORDER — LEVOCETIRIZINE DIHYDROCHLORIDE 5 MG/1
5 TABLET, FILM COATED ORAL EVERY EVENING
COMMUNITY
End: 2025-08-20 | Stop reason: SDUPTHER

## 2025-08-20 RX ORDER — LEVOCETIRIZINE DIHYDROCHLORIDE 5 MG/1
5 TABLET, FILM COATED ORAL EVERY EVENING
Qty: 90 TABLET | Refills: 3 | Status: SHIPPED | OUTPATIENT
Start: 2025-08-20

## 2025-08-26 ENCOUNTER — LAB (OUTPATIENT)
Dept: LAB | Facility: HOSPITAL | Age: 75
End: 2025-08-26
Payer: MEDICARE

## 2025-08-26 DIAGNOSIS — E87.5 HYPERKALEMIA: ICD-10-CM

## 2025-08-26 DIAGNOSIS — M25.552 LEFT HIP PAIN: ICD-10-CM

## 2025-08-26 LAB — POTASSIUM SERPL-SCNC: 4.6 MMOL/L (ref 3.5–5.2)

## 2025-08-26 PROCEDURE — 84132 ASSAY OF SERUM POTASSIUM: CPT

## 2025-08-26 PROCEDURE — 36415 COLL VENOUS BLD VENIPUNCTURE: CPT
